# Patient Record
Sex: MALE | Race: WHITE | NOT HISPANIC OR LATINO | Employment: UNEMPLOYED | URBAN - METROPOLITAN AREA
[De-identification: names, ages, dates, MRNs, and addresses within clinical notes are randomized per-mention and may not be internally consistent; named-entity substitution may affect disease eponyms.]

---

## 2017-04-22 ENCOUNTER — APPOINTMENT (OUTPATIENT)
Dept: GENERAL RADIOLOGY | Facility: CLINIC | Age: 37
End: 2017-04-22
Attending: EMERGENCY MEDICINE
Payer: COMMERCIAL

## 2017-04-22 ENCOUNTER — HOSPITAL ENCOUNTER (EMERGENCY)
Facility: CLINIC | Age: 37
Discharge: HOME | End: 2017-04-22
Attending: EMERGENCY MEDICINE | Admitting: EMERGENCY MEDICINE
Payer: COMMERCIAL

## 2017-04-22 VITALS
TEMPERATURE: 98.5 F | HEIGHT: 75 IN | BODY MASS INDEX: 26.11 KG/M2 | HEART RATE: 71 BPM | OXYGEN SATURATION: 100 % | WEIGHT: 210 LBS | SYSTOLIC BLOOD PRESSURE: 151 MMHG | RESPIRATION RATE: 16 BRPM | DIASTOLIC BLOOD PRESSURE: 91 MMHG

## 2017-04-22 DIAGNOSIS — S93.402A SPRAIN OF LEFT ANKLE, UNSPECIFIED LIGAMENT, INITIAL ENCOUNTER: ICD-10-CM

## 2017-04-22 DIAGNOSIS — T73.3XXA OVEREXERTION FROM PROLONGED STATIC POSITION: ICD-10-CM
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
## 2017-04-22 DIAGNOSIS — S92.302A AVULSION FRACTURE OF METATARSAL BONE OF LEFT FOOT, CLOSED, INITIAL ENCOUNTER: ICD-10-CM

## 2017-04-22 DIAGNOSIS — S93.602A FOOT SPRAIN, LEFT, INITIAL ENCOUNTER: ICD-10-CM

## 2017-04-22 PROCEDURE — 99284 EMERGENCY DEPT VISIT MOD MDM: CPT

## 2017-04-22 PROCEDURE — 99284 EMERGENCY DEPT VISIT MOD MDM: CPT | Mod: Z6 | Performed by: EMERGENCY MEDICINE

## 2017-04-22 PROCEDURE — 73630 X-RAY EXAM OF FOOT: CPT | Mod: LT

## 2017-04-22 PROCEDURE — 73610 X-RAY EXAM OF ANKLE: CPT | Mod: LT

## 2017-04-22 RX ORDER — NAPROXEN 500 MG/1
500 TABLET ORAL 2 TIMES DAILY WITH MEALS
Qty: 16 TABLET | Refills: 0 | Status: SHIPPED | OUTPATIENT
Start: 2017-04-22 | End: 2017-04-30

## 2017-04-22 RX ORDER — OXYCODONE AND ACETAMINOPHEN 5; 325 MG/1; MG/1
1-2 TABLET ORAL EVERY 4 HOURS PRN
Qty: 15 TABLET | Refills: 0 | Status: SHIPPED | OUTPATIENT
Start: 2017-04-22

## 2017-04-22 ASSESSMENT — ENCOUNTER SYMPTOMS
NUMBNESS: 0
ARTHRALGIAS: 1
COLOR CHANGE: 0
WEAKNESS: 0
ABDOMINAL PAIN: 0
FEVER: 0
SHORTNESS OF BREATH: 0

## 2017-04-22 NOTE — ED PROVIDER NOTES
"  History     Chief Complaint   Patient presents with     Foot Pain     Rolled left foot playing BB yesterday.     HPI  Willis Mcnulty is a 36 year old male who resents with left ankle and left foot pain since last night. Patient was playing basketball and he states that he \"rolled the ankle\". Since then he has been experiencing constant, throbbing, moderate, non-radiating pain in left ankle and left foot that is worse with movement and better with rest.He did ice the affected area. He also took an ibuprofen this morning prior to arrival. No fevers. No chronic medical problems. He is a smoker.    I have reviewed the Medications, Allergies, Past Medical and Surgical History, and Social History in the Epic system.    Review of Systems   Constitutional: Negative for fever.   Respiratory: Negative for shortness of breath.    Cardiovascular: Negative for chest pain.   Gastrointestinal: Negative for abdominal pain.   Musculoskeletal: Positive for arthralgias ( left ankle) and gait problem.   Skin: Negative for color change.   Neurological: Negative for weakness and numbness.   All other systems reviewed and are negative.      Physical Exam   BP: (!) 153/104  Pulse: 72  Temp: 98.5  F (36.9  C)  Resp: 16  Height: 190.5 cm (6' 3\")  Weight: 95.3 kg (210 lb)  SpO2: 99 %  Physical Exam   Constitutional: He is oriented to person, place, and time. He appears well-developed and well-nourished. No distress.   HENT:   Head: Normocephalic and atraumatic.   Eyes: Conjunctivae and EOM are normal.   Neck: Normal range of motion.   Cardiovascular: Normal rate and intact distal pulses.    Pulses:       Dorsalis pedis pulses are 2+ on the right side, and 2+ on the left side.        Posterior tibial pulses are 2+ on the right side, and 2+ on the left side.   Pulmonary/Chest: Effort normal. No respiratory distress.   Musculoskeletal: Normal range of motion. He exhibits edema and tenderness.   Moderate edema to the lateral aspect of left " ankle and left, lateral foot. There is tenderness over left lateral malleolus.There is no tenderness over the base of the left, fifth metatarsal. No tenderness over the left midfoot. Left Achilles tendon is intact. Full range of motion, active and passive,in the left hip, knee, and ankle with mild pain in left ankle with range of motion. No tenderness over left knee.   Neurological: He is alert and oriented to person, place, and time. He has normal strength and normal reflexes. He displays normal reflexes. No cranial nerve deficit or sensory deficit. He exhibits normal muscle tone. Coordination and gait normal. GCS eye subscore is 4. GCS verbal subscore is 5. GCS motor subscore is 6.   Reflex Scores:       Patellar reflexes are 2+ on the right side and 2+ on the left side.       Achilles reflexes are 2+ on the right side and 2+ on the left side.  Skin: Skin is warm. He is not diaphoretic.   Psychiatric: He has a normal mood and affect. His behavior is normal. Judgment and thought content normal.   Nursing note and vitals reviewed.      ED Course     ED Course     Procedures             Critical Care time:  none               Labs Ordered and Resulted from Time of ED Arrival Up to the Time of Departure from the ED - No data to display    Assessments & Plan (with Medical Decision Making)   36-year-old man presenting with left ankle and left foot pain. Differential diagnosis: ankle sprain, contusion, fracture, unlikely dislocation.    After thorough history and physical examination patient appears to be in no acute distress. I will obtain an x-ray of the left ankle and left foot for further evaluation .Patient agrees with the plan.    I reviewed patient's x-rays and read the radiology report; there is no fracture in left ankle. There is a possible avulsion fracture in left foot. Patient was placed into a cam boot walker and referred to orthopedic clinic for further evaluation. I will give him prescription for naproxen  and Percocet. He'll be provided with crutches for gait support. He agrees with the plan.    I have reviewed the nursing notes.    I have reviewed the findings, diagnosis, plan and need for follow up with the patient.    Discharge Medication List as of 4/22/2017  8:37 AM      START taking these medications    Details   naproxen (NAPROSYN) 500 MG tablet Take 1 tablet (500 mg) by mouth 2 times daily (with meals) for 8 days, Disp-16 tablet, R-0, Local Print      oxyCODONE-acetaminophen (PERCOCET) 5-325 MG per tablet Take 1-2 tablets by mouth every 4 hours as needed for pain, Disp-15 tablet, R-0, Local Print             Final diagnoses:   Sprain of left ankle, unspecified ligament, initial encounter   Foot sprain, left, initial encounter   Avulsion fracture of metatarsal bone of left foot, closed, initial encounter       4/22/2017   [facility], [place], EMERGENCY DEPARTMENT     Catrina Nunez MD  04/22/17 6158

## 2017-04-22 NOTE — ED AVS SNAPSHOT
[facility], Emergency Department    [address]    [place] [address]    Phone:  [phone]    Fax:  [phone]                                       Willis Mcnulty   MRN: [id number]    Department:  [facility], Emergency Department   Date of Visit:  4/22/2017           Patient Information     Date Of Birth          1980        Your diagnoses for this visit were:     Sprain of left ankle, unspecified ligament, initial encounter     Foot sprain, left, initial encounter     Avulsion fracture of metatarsal bone of left foot, closed, initial encounter        You were seen by Catrina Nunez MD.        Discharge Instructions       Please make an appointment to follow up with Orthopedics (phone: [phone]) as soon as possible for further evaluation and recommendations.    Foot Fracture  You have a broken bone (fracture) in your foot. This will cause pain, swelling, and sometimes bruising. It will take about 4 to 6 weeks to heal. A foot fracture may be treated with a special shoe, splint, cast, or boot.  Home care  Follow these guidelines when caring for yourself at home:    You may be given a splint, cast, shoe, or boot to keep the injured area from moving. Unless you were told otherwise, use crutches or a walker. Don t put weight on the injured foot until your health care provider says you can do so. (You can rent crutches and a walker at many pharmacies and surgical or orthopedic supply stores.) Don t put weight on a splint, or it will break.    Keep your leg elevated to reduce pain and swelling. When sleeping, put a pillow under the injured leg. When sitting, support the injured leg so it is level with your waist. This is very important during the first 2 days (48 hours).    Put an ice pack on the injured area. Do this for 20 minutes every 1 to 2 hours the first day for pain relief. You can make an ice pack by wrapping a plastic bag of ice cubes in a thin towel. As the ice melts, be careful  that the splint/cast/boot/shoe doesn t get wet. You can place the ice pack directly over the splint or cast. Unless told otherwise, you can open the boot or shoe to apply the ice pack. Continue using the ice pack 3 to 4 times a day for the next 2 days. Then use the ice pack as needed to ease pain and swelling.    Keep the splint/cast/boot/shoe dry. When bathing, protect it with a large plastic bag, rubber-banded at the top end. If a fiberglass splint or cast or boot gets wet, you can dry it with a hair dryer. Unless told otherwise, you can take off the boot or shoe to bathe.    You may use acetaminophen or ibuprofen to control pain, unless another pain medicine was prescribed. If you have chronic liver or kidney disease, talk with your health care provider before using these medicines. Also talk with your provider if you ve had a stomach ulcer or GI bleeding.    Don t put creams or objects under the cast if you have itching.  Follow-up care  Follow up with your health care provider within 1 week, or as advised. This is to make sure the bone is healing the way it should. If you were given a splint, it may be changed to a cast or boot at your follow-up visit.  If X-rays were taken, a radiologist will look at them. You will be told of any new findings that may affect your care.  When to seek medical advice  Call your health care provider right away if any of these occur:    The cast cracks    The plaster cast or splint becomes wet or soft    The fiberglass cast or splint stays wet for more than 24 hours    Bad odor from the cast or wound fluid stains the cast    Tightness or pain under the cast or splint gets worse    Toes become swollen, cold, blue, numb, or tingly    You can t move your toes    Skin around cast becomes red    Fever of 101 F (38.3 C) or higher, or as directed by your health care provider    7007-2505 The [employer]. [address] [place] [address]. All rights reserved. This  information is not intended as a substitute for professional medical care. Always follow your healthcare professional's instructions.          Aircast   Traditional splints and casts for the foot and ankle protect the injury by preventing movement at the joints. However, many injuries heal better and faster if the injured joint can be moved, while protected at the same time. This is the reason for using an Aircast.  There are two common type of AirCasts:  1) Air-Stirrup  ankle splint  This is often used to treat ankle sprains. It contains padded air cells in a plastic frame that fits into your shoe. This allows you to walk while preventing the ankle joint from rolling in or out causing re-injury.  Ankle sprains can take 4-6 weeks to heal. Persons with severe injuries or over age 60 may require more time to heal. During that time, you are prone to re-injury by suddenly twisting your ankle again while the ligaments are still weak.  When treating a sprain, the Air-Stirrup splint should be worn whenever walking for at least four weeks, or as long as you continue to have ankle pain. You should continue to wear it at least 6 weeks whenever running, playing sports or any activity where there is increased risk of re-injury. Talk to your doctor for specific advice about the treatment of your condition.  2) SP-Walker  boot  This is a short boot that provides support and protection to the foot and ankle while allowing you to walk. It contains padded air cells that provide compression and help circulation. It is used for both foot and ankle injuries - both sprains and minor fractures. Talk to your doctor for specific advice about the treatment of your condition.  Air-Stirrup  and SP-Walker  are trademarks of [employer].  For more information about their products, see www.aircast.com.    2203-6442 The [employer]. [address] [place] [address], [place] [address]. All rights reserved. This information is not intended as a  substitute for professional medical care. Always follow your healthcare professional's instructions.          Foot Sprain    A sprain is a stretching or tearing of the ligaments that hold a joint together. There are no broken bones. Sprains generally take from 3-6 weeks to heal. A sprain may be treated with a splint, walking cast, or special boot. Mild sprains may not need any additional support.  Home care  The following guidelines will help you care for your injury at home:    Keep your leg elevated when sitting or lying down. This is very important during the first 48 hours to reduce swelling. Stay off the injured foot as much as possible until you can walk on it without pain. If needed, you may use crutches during the first week for this purpose. Crutches can be rented at many pharmacies or surgical/orthopedic supply stores.    You may be given a cast shoe to wear to prevent movement in your foot. If not, you can use a sandal or any shoe that does not put pressure on the injured area until the swelling and pain go away. If using a sandal, be careful not to hit your foot against anything, since another injury could make the sprain worse.    Apply an ice pack over the injured area for 15 to 20 minutes every 3 to 6 hours. You should do this for the first 24 to 48 hours. You can make an ice pack by filling a plastic bag that seals at the top with ice cubes and then wrapping it with a thin towel. Continue to use ice packs for relief of pain and swelling as needed. As the ice melts, avoid getting any wrap, splint, or cast wet. After 48 hours, apply heat from a warm shower or bath for 20 minutes several times daily. Alternating ice and heat may also be helpful.    You may use over-the-counter pain medicine to control pain, unless another medicine was prescribed. If you have chronic liver or kidney disease or ever had a stomach ulcer or GI bleeding, talk with your healthcare provider before using these medicines.    If  you were given a splint or cast, keep it dry. Bathe with your splint or cast well out of the water, protected with 2 large plastic bags, rubber-banded at the top end. If a fiberglass splint or cast gets wet, you can dry it with a hair dryer.    You may return to sports after healing, when you can run without pain.  Follow-up care  Follow up with your healthcare provider as directed. Sometimes fractures don t show up on the first X-ray. Bruises and sprains can sometimes hurt as much as a fracture. These injuries can take time to heal completely. If your symptoms don t improve or they get worse, talk with your healthcare provider. You may need a repeat X-ray.  When to seek medical advice  Call your healthcare provider right away if any of these occur:    The plaster cast or splint gets wet or soft    The fiberglass cast or splint gets wet and does not dry for 24 hours    Pain or swelling increases, or redness appears    A bad odor comes from within the cast    Fever of 100.4 F (38 C) or above lasting for 24 to 48 hours    Toes on the injured foot become cold, blue, numb, or tingly    3681-6048 The [employer]. [address] [place] [address]. All rights reserved. This information is not intended as a substitute for professional medical care. Always follow your healthcare professional's instructions.          Treating Ankle Sprains  Treatment will depend on how bad your sprain is. For a severe sprain, healing may take 3 months or more.  Right after your injury: Use R.I.C.E.    Rest: At first, keep weight off the ankle as much as you can. You may be given crutches to help you walk without putting weight on the ankle.    Ice: Put an ice pack on the ankle for 15 minutes. Remove the pack and wait at least 30 minutes. Repeat for up to 3 days. This helps reduce swelling.    Compression: To reduce swelling and keep the joint stable, you may need to wrap the ankle with an elastic bandage. For more severe  sprains, you may need an ankle brace or a cast.    Elevation: To reduce swelling, keep your ankle raised above your heart when you sit or lie down.  Medicine  Your healthcare provider may suggest oral non-steroidal anti-inflammatory medicine (NSAIDs), such as ibuprofen. This relieves the pain and helps reduce any swelling. Be sure to take your medicine as directed.  Contrast baths  After 3 days, soak your ankle in warm water for 30 seconds, then in cool water for 30 seconds. Go back and forth for 5 minutes. Doing this every 2 hours will help keep the swelling down.  Exercises    After about 2 to 3 weeks, you may be given exercises to strengthen the ligaments and muscles in the ankle. Doing these exercises will help prevent another ankle sprain. Exercises may include standing on your toes and then on your heels and doing ankle curls.  Ankle curls    Sit on the edge of a sturdy table or lie on your back.    Pull your toes toward you. Then point them away from you. Repeat for 2 to 3 minutes.     8891-2993 The [employer]. [address] [place] [address]. All rights reserved. This information is not intended as a substitute for professional medical care. Always follow your healthcare professional's instructions.            Discharge References/Attachments     CRUTCHES, USING: NON-WEIGHT-BEARING (ENGLISH)      24 Hour Appointment Hotline       To make an appointment at any [facility] clinic, call [phone] ([phone]). If you don't have a family doctor or clinic, we will help you find one. [facility] clinics are conveniently located to serve the needs of you and your family.          ED Discharge Orders     Ankilizer CAM boot           Crutches       Use gait belt during crutch training.            ORTHO  REFERRAL       [facility] is referring you to the Orthopedic  Services at [facility].       The  Representative will assist you  in the coordination of your Orthopedic and Musculoskeletal Care as prescribed by your physician.    The  Representative will call you within 1 business day to help schedule your appointment, or you may contact the  Representative at:    All areas ~ [phone]     Type of Referral : [facility] / Foot & Ankle Surgery       Timeframe requested: 3 - 5 days    Coverage of these services is subject to the terms and limitations of your health insurance plan.  Please call member services at your health plan with any benefit or coverage questions.      If X-rays, CT or MRI's have been performed, please contact the facility where they were done to arrange for , prior to your scheduled appointment.  Please bring this referral request to your appointment and present it to your specialist.                     Review of your medicines      START taking        Dose / Directions Last dose taken    naproxen 500 MG tablet   Commonly known as:  NAPROSYN   Dose:  500 mg   Quantity:  16 tablet        Take 1 tablet (500 mg) by mouth 2 times daily (with meals) for 8 days   Refills:  0        oxyCODONE-acetaminophen 5-325 MG per tablet   Commonly known as:  PERCOCET   Dose:  1-2 tablet   Quantity:  15 tablet        Take 1-2 tablets by mouth every 4 hours as needed for pain   Refills:  0          Our records show that you are taking the medicines listed below. If these are incorrect, please call your family doctor or clinic.        Dose / Directions Last dose taken    IBUPROFEN PO   Dose:  400 mg        Take 400 mg by mouth as needed for moderate pain   Refills:  0                Prescriptions were sent or printed at these locations (2 Prescriptions)                   Other Prescriptions                Printed at Department/Unit printer (2 of 2)         naproxen (NAPROSYN) 500 MG tablet               oxyCODONE-acetaminophen (PERCOCET) 5-325 MG per tablet                Procedures and tests performed  "during your visit     Ankle XR, G/E 3 views, left    Foot XR, G/E 3 views, left      Orders Needing Specimen Collection     None      Pending Results     No orders found from 2017 to 2017.            Pending Culture Results     No orders found from 2017 to 2017.            Thank you for choosing [facility]       Thank you for choosing [facility] for your care. Our goal is always to provide you with excellent care. Hearing back from our patients is one way we can continue to improve our services. Please take a few minutes to complete the written survey that you may receive in the mail after you visit with us. Thank you!        [employer] Information     [employer] lets you send messages to your doctor, view your test results, renew your prescriptions, schedule appointments and more. To sign up, go to [email or web address] . Click on \"Log in\" on the left side of the screen, which will take you to the Welcome page. Then click on \"Sign up Now\" on the right side of the page.     You will be asked to enter the access code listed below, as well as some personal information. Please follow the directions to create your username and password.     Your access code is: [email or web address]  Expires: 2017  8:37 AM     Your access code will  in 90 days. If you need help or a new code, please call your [facility] clinic or [phone].        Care EveryWhere ID     This is your Care EveryWhere ID. This could be used by other organizations to access your [facility] medical records  [id number]        After Visit Summary       This is your record. Keep this with you and show to your community pharmacist(s) and doctor(s) at your next visit.                  "

## 2017-04-22 NOTE — DISCHARGE INSTRUCTIONS
Please make an appointment to follow up with Orthopedics (phone: [phone]) as soon as possible for further evaluation and recommendations.    Foot Fracture  You have a broken bone (fracture) in your foot. This will cause pain, swelling, and sometimes bruising. It will take about 4 to 6 weeks to heal. A foot fracture may be treated with a special shoe, splint, cast, or boot.  Home care  Follow these guidelines when caring for yourself at home:    You may be given a splint, cast, shoe, or boot to keep the injured area from moving. Unless you were told otherwise, use crutches or a walker. Don t put weight on the injured foot until your health care provider says you can do so. (You can rent crutches and a walker at many pharmacies and surgical or orthopedic supply stores.) Don t put weight on a splint, or it will break.    Keep your leg elevated to reduce pain and swelling. When sleeping, put a pillow under the injured leg. When sitting, support the injured leg so it is level with your waist. This is very important during the first 2 days (48 hours).    Put an ice pack on the injured area. Do this for 20 minutes every 1 to 2 hours the first day for pain relief. You can make an ice pack by wrapping a plastic bag of ice cubes in a thin towel. As the ice melts, be careful that the splint/cast/boot/shoe doesn t get wet. You can place the ice pack directly over the splint or cast. Unless told otherwise, you can open the boot or shoe to apply the ice pack. Continue using the ice pack 3 to 4 times a day for the next 2 days. Then use the ice pack as needed to ease pain and swelling.    Keep the splint/cast/boot/shoe dry. When bathing, protect it with a large plastic bag, rubber-banded at the top end. If a fiberglass splint or cast or boot gets wet, you can dry it with a hair dryer. Unless told otherwise, you can take off the boot or shoe to bathe.    You may use acetaminophen or ibuprofen to control pain, unless another  pain medicine was prescribed. If you have chronic liver or kidney disease, talk with your health care provider before using these medicines. Also talk with your provider if you ve had a stomach ulcer or GI bleeding.    Don t put creams or objects under the cast if you have itching.  Follow-up care  Follow up with your health care provider within 1 week, or as advised. This is to make sure the bone is healing the way it should. If you were given a splint, it may be changed to a cast or boot at your follow-up visit.  If X-rays were taken, a radiologist will look at them. You will be told of any new findings that may affect your care.  When to seek medical advice  Call your health care provider right away if any of these occur:    The cast cracks    The plaster cast or splint becomes wet or soft    The fiberglass cast or splint stays wet for more than 24 hours    Bad odor from the cast or wound fluid stains the cast    Tightness or pain under the cast or splint gets worse    Toes become swollen, cold, blue, numb, or tingly    You can t move your toes    Skin around cast becomes red    Fever of 101 F (38.3 C) or higher, or as directed by your health care provider    0644-9864 The [employer]. [address] [place] [address]. All rights reserved. This information is not intended as a substitute for professional medical care. Always follow your healthcare professional's instructions.          Aircast   Traditional splints and casts for the foot and ankle protect the injury by preventing movement at the joints. However, many injuries heal better and faster if the injured joint can be moved, while protected at the same time. This is the reason for using an Aircast.  There are two common type of AirCasts:  1) Air-Stirrup  ankle splint  This is often used to treat ankle sprains. It contains padded air cells in a plastic frame that fits into your shoe. This allows you to walk while preventing the ankle  joint from rolling in or out causing re-injury.  Ankle sprains can take 4-6 weeks to heal. Persons with severe injuries or over age 60 may require more time to heal. During that time, you are prone to re-injury by suddenly twisting your ankle again while the ligaments are still weak.  When treating a sprain, the Air-Stirrup splint should be worn whenever walking for at least four weeks, or as long as you continue to have ankle pain. You should continue to wear it at least 6 weeks whenever running, playing sports or any activity where there is increased risk of re-injury. Talk to your doctor for specific advice about the treatment of your condition.  2) SP-Walker  boot  This is a short boot that provides support and protection to the foot and ankle while allowing you to walk. It contains padded air cells that provide compression and help circulation. It is used for both foot and ankle injuries - both sprains and minor fractures. Talk to your doctor for specific advice about the treatment of your condition.  Air-Stirrup  and SP-Walker  are trademarks of [employer].  For more information about their products, see www.aircast.com.    8137-5512 [employer]. [address] [place] [address]. All rights reserved. This information is not intended as a substitute for professional medical care. Always follow your healthcare professional's instructions.          Foot Sprain    A sprain is a stretching or tearing of the ligaments that hold a joint together. There are no broken bones. Sprains generally take from 3-6 weeks to heal. A sprain may be treated with a splint, walking cast, or special boot. Mild sprains may not need any additional support.  Home care  The following guidelines will help you care for your injury at home:    Keep your leg elevated when sitting or lying down. This is very important during the first 48 hours to reduce swelling. Stay off the injured foot as much as possible until you  can walk on it without pain. If needed, you may use crutches during the first week for this purpose. Crutches can be rented at many pharmacies or surgical/orthopedic supply stores.    You may be given a cast shoe to wear to prevent movement in your foot. If not, you can use a sandal or any shoe that does not put pressure on the injured area until the swelling and pain go away. If using a sandal, be careful not to hit your foot against anything, since another injury could make the sprain worse.    Apply an ice pack over the injured area for 15 to 20 minutes every 3 to 6 hours. You should do this for the first 24 to 48 hours. You can make an ice pack by filling a plastic bag that seals at the top with ice cubes and then wrapping it with a thin towel. Continue to use ice packs for relief of pain and swelling as needed. As the ice melts, avoid getting any wrap, splint, or cast wet. After 48 hours, apply heat from a warm shower or bath for 20 minutes several times daily. Alternating ice and heat may also be helpful.    You may use over-the-counter pain medicine to control pain, unless another medicine was prescribed. If you have chronic liver or kidney disease or ever had a stomach ulcer or GI bleeding, talk with your healthcare provider before using these medicines.    If you were given a splint or cast, keep it dry. Bathe with your splint or cast well out of the water, protected with 2 large plastic bags, rubber-banded at the top end. If a fiberglass splint or cast gets wet, you can dry it with a hair dryer.    You may return to sports after healing, when you can run without pain.  Follow-up care  Follow up with your healthcare provider as directed. Sometimes fractures don t show up on the first X-ray. Bruises and sprains can sometimes hurt as much as a fracture. These injuries can take time to heal completely. If your symptoms don t improve or they get worse, talk with your healthcare provider. You may need a repeat  X-ray.  When to seek medical advice  Call your healthcare provider right away if any of these occur:    The plaster cast or splint gets wet or soft    The fiberglass cast or splint gets wet and does not dry for 24 hours    Pain or swelling increases, or redness appears    A bad odor comes from within the cast    Fever of 100.4 F (38 C) or above lasting for 24 to 48 hours    Toes on the injured foot become cold, blue, numb, or tingly    3123-5680 [employer]. [address] [place] [address]. All rights reserved. This information is not intended as a substitute for professional medical care. Always follow your healthcare professional's instructions.          Treating Ankle Sprains  Treatment will depend on how bad your sprain is. For a severe sprain, healing may take 3 months or more.  Right after your injury: Use R.I.C.E.    Rest: At first, keep weight off the ankle as much as you can. You may be given crutches to help you walk without putting weight on the ankle.    Ice: Put an ice pack on the ankle for 15 minutes. Remove the pack and wait at least 30 minutes. Repeat for up to 3 days. This helps reduce swelling.    Compression: To reduce swelling and keep the joint stable, you may need to wrap the ankle with an elastic bandage. For more severe sprains, you may need an ankle brace or a cast.    Elevation: To reduce swelling, keep your ankle raised above your heart when you sit or lie down.  Medicine  Your healthcare provider may suggest oral non-steroidal anti-inflammatory medicine (NSAIDs), such as ibuprofen. This relieves the pain and helps reduce any swelling. Be sure to take your medicine as directed.  Contrast baths  After 3 days, soak your ankle in warm water for 30 seconds, then in cool water for 30 seconds. Go back and forth for 5 minutes. Doing this every 2 hours will help keep the swelling down.  Exercises    After about 2 to 3 weeks, you may be given exercises to strengthen the  ligaments and muscles in the ankle. Doing these exercises will help prevent another ankle sprain. Exercises may include standing on your toes and then on your heels and doing ankle curls.  Ankle curls    Sit on the edge of a sturdy table or lie on your back.    Pull your toes toward you. Then point them away from you. Repeat for 2 to 3 minutes.     2061-3563 [employer]. [address] [place] [address], [place] [address]. All rights reserved. This information is not intended as a substitute for professional medical care. Always follow your healthcare professional's instructions.

## 2017-04-22 NOTE — ED AVS SNAPSHOT
[facility], Emergency Department    [address]    [facility] [address]    Phone:  [phone]    Fax:  [phone]                                       Willis Mcnulty   MRN: [id number]    Department:  [facility], Emergency Department   Date of Visit:  4/22/2017           After Visit Summary Signature Page     I have received my discharge instructions, and my questions have been answered. I have discussed any challenges I see with this plan with the nurse or doctor.    ..........................................................................................................................................  Patient/Patient Representative Signature      ..........................................................................................................................................  Patient Representative Print Name and Relationship to Patient    ..................................................               ................................................  Date                                            Time    ..........................................................................................................................................  Reviewed by Signature/Title    ...................................................              ..............................................  Date                                                            Time

## 2017-04-28 ENCOUNTER — OFFICE VISIT (OUTPATIENT)
Dept: PODIATRY | Facility: CLINIC | Age: 37
End: 2017-04-28
Payer: COMMERCIAL

## 2017-04-28 VITALS — WEIGHT: 210 LBS | HEART RATE: 86 BPM | OXYGEN SATURATION: 99 % | BODY MASS INDEX: 26.25 KG/M2

## 2017-04-28 DIAGNOSIS — S93.402A SPRAIN OF LEFT ANKLE, UNSPECIFIED LIGAMENT, INITIAL ENCOUNTER: Primary | ICD-10-CM

## 2017-04-28 PROCEDURE — 99203 OFFICE O/P NEW LOW 30 MIN: CPT | Performed by: PODIATRIST

## 2017-04-28 NOTE — NURSING NOTE
"Chief Complaint   Patient presents with     Ankle Pain     left foot       Initial Pulse 86  Wt 95.3 kg (210 lb)  SpO2 99%  BMI 26.25 kg/m2 Estimated body mass index is 26.25 kg/(m^2) as calculated from the following:    Height as of 4/22/17: 1.905 m (6' 3\").    Weight as of this encounter: 95.3 kg (210 lb).  Medication Reconciliation: complete  "

## 2017-04-28 NOTE — PROGRESS NOTES
Subjective:    Patient seen as a new patient consult from Dr. Nunez and is seen today  for left ankle sprain.  Had inversion sprain 7 days ago.  Playing basketball in untied shoes!  Had pain and edema, but this is slowly getting better.  Seen in clinic, xrays taken, and given air cast.  Has walked on this  A few times and felt OK.  He is a .          ROS:  denies numbness, erythema, shortness of breath     No Known Allergies    Current Outpatient Prescriptions   Medication Sig Dispense Refill     IBUPROFEN PO Take 400 mg by mouth as needed for moderate pain       naproxen (NAPROSYN) 500 MG tablet Take 1 tablet (500 mg) by mouth 2 times daily (with meals) for 8 days 16 tablet 0     oxyCODONE-acetaminophen (PERCOCET) 5-325 MG per tablet Take 1-2 tablets by mouth every 4 hours as needed for pain 15 tablet 0       There is no problem list on file for this patient.      No past medical history on file.    Past Surgical History:   Procedure Laterality Date     ORTHOPEDIC SURGERY         No family history on file.    Social History   Substance Use Topics     Smoking status: Current Every Day Smoker     Packs/day: 0.25     Smokeless tobacco: Not on file     Alcohol use Yes      Comment: daily       Objective:    Pulse 86  Wt 95.3 kg (210 lb)  SpO2 99%  BMI 26.25 kg/m2.  Patient pleasant to talk with and in no apparent distress.   CRT < 3 seconds all toes.  No varicosities noted.   +2/4 DP & PT bilateral, sensation to light touch is intact.  Achilles reflex 2/4 bilateral.  Normal arch with weight bearing.  Muscle strength 5/5 in all compartments.  No pain with stressing any tendons.  Normal ROM all forefoot and rearfoot joints.  Cavus arch with weightbearing.     positive edema.  positive ecchymosis  negative erythema  positive pain at 3/4/5 TMTJs   No pain styloid process.  negative Achilles or calcaneal tubercle pain  negative medial ankle pain  negative pain AITF ligament  positive pain over  ATFL/CFL  negative pain with stressing or palpation peroneal tendons  negative peroneal subluxation  negative pain over medial or lateral malleoli    X-rays normal    Assessment:  Left  ankle sprain       Plan:  X-rays personally reviewed.  Discussed etiology and treatment options with the patient in detail.  Discussed PRICE.  Dispensed ankle brace to prevent reinjury while still healing.  If he feels unstable discussed physical therapy for lateral strengthening and proprioception training.  He declines at this time but will call if her feels he needs this.  Note for work to start in 3 days, weightbearing as tolerated.  Return to clinic prn.   Thank you for allowing me participate in the care of this patient.        Lawrence Pack DPM, FACFAS

## 2017-04-28 NOTE — LETTER
[address] [address]  Phone: [phone]    April 28, 2017        Willis Mcnulty  [address]  [facility] [address]          To whom it may concern:    RE: Willis Mcnulty    Patient was seen and treated today at our clinic and missed work.  Patient may return to work 5/1/17 with the following:  Activities as tolerated    Please contact me for questions or concerns.      Sincerely,        Lawrence Pack DPM

## 2017-04-28 NOTE — PATIENT INSTRUCTIONS
We wish you continued good healing. If you have any questions or concerns, please do not hesitate to contact us at [phone].      Please remember to call and schedule a follow up appointment if one was recommended at your earliest convenience.   PODIATRY CLINIC HOURS  TELEPHONE NUMBER    Dr. Lawrence Pack D.P.[facility]    Clinics:  [facility]        Ashlie Weiss MA  Medical Assistant  Tuesday 1PM-6PM  [place]  Wednesday 7AM-2PM  [place]  Thursday 10AM-6PM  [place] Friday 7AM-345PM  [place]  Specialty schedulers:   [phone] to make an appointment with any Specialty Provider.        Urgent Care locations:    [facility] Monday-Friday 5 pm - 9 pm. Saturday-Sunday 9 am -5pm    Monday-Friday 11 am - 9 pm Saturday 9 am - 5 pm     Monday-Sunday 12 noon-8PM [phone]     If you need a medication refill, please contact us you may need lab work and/or a follow up visit prior to your refill (i.e. Antifungal medications).    "[employer]hart (secure e-mail communication and access to your chart) to send a message or to make an appointment.    If MRI needed please call Tae Shafer at [phone]        Weight management plan: Patient was referred to their PCP to discuss a diet and exercise plan.  SMOKING CESSATION    What's in cigarette smoke? - Cigarette smoke contains over 4,000 chemicals. Nicotine is one of the main ingredients which is an insecticide/herbicide. It is poisonous to our nervous system, increases blood clotting risk, and decreases the body's defenses to fight off infection. Another chemical is Carbon Monoxide is an asphyxiating gas that permanently binds to blood cells and blocks the transport of oxygen. This leads to tissue death and decreases your metabolism. Tar is a chemical that coats your lungs and trachea which impairs new oxygen coming in and  carbon dioxide getting out of your body.   How does smoking impact surgery? - Smoking is particularly hazardous with regards to surgery. Surgery puts stress on the body and a smoker's body is already under strain from these chemicals. Putting the two together, especially for an elective surgery, could be a recipe for disaster. Smoking before and after surgery increases your risk of heart problems, slow wound healing, delayed bone healing, blood clots, wound infection and anesthesia complications.   What are the benefits of quitting? - In 20 minutes your blood pressure will drop back down to normal. In 8 hours the carbon monoxide (a toxic gas) levels in your blood stream will drop by half, and oxygen levels will return to normal. In 48 hours your chance of having a heart attack will have decreased. All nicotine will have left your body. Your sense of taste and smell will return to a normal level. In 72 hours your bronchial tubes will relax, and your energy levels will increase. In 2 weeks your circulation will increase, and it will continue to improve for the next 10 weeks.    Recommendations for elective surgery - Ideally, patients should quit smoking 8 weeks before and at least 2 weeks after elective surgery in order to avoid complications. Simply cutting back on the amount of cigarettes smoked per day does not offer any benefit or decrease the risk of poor wound healing, heart problems, and infection. Smokers should also start taking Vitamin C and B for two weeks before surgery and two weeks after surgery.    Ways to Stop Smokin. Nicotine patches, lozenges, or gum  2. Support Groups  3. Medications (see below)    List of Medications:  1. Varenicline Tartrate (CHANTIX)   2. Bupropion HCL (WELLBUTRIN, ZYBAN)   note: make sure Wellbutrin is for smoking cessation and not other issues   3. Nicotine Patch (NICODERM)   4. Nicotine Inhaler (NICOTROL)   5. Nicotine Gum Nicotine Polacrilex   6. Nicotine Lozenge:  Nicotine Polacrilex (COMMIT)   * [facility] offers a smoking support group as well!  Please visit: [email or web address]  If you are interested in these, ask about getting a prescription or talk to your primary care doctor about what may be the best way for you to quit.

## 2017-04-28 NOTE — LETTER
4/28/2017       RE: Willis Mcnulty  [address]  [facility] [address]           Dear Colleague,    Thank you for referring your patient, Willis Mcnulty, to the [facility]. Please see a copy of my visit note below.    Subjective:    Patient seen as a new patient consult from Dr. Nunez and is seen today  for left ankle sprain.  Had inversion sprain 7 days ago.  Playing basketball in untied shoes!  Had pain and edema, but this is slowly getting better.  Seen in clinic, xrays taken, and given air cast.  Has walked on this  A few times and felt OK.  He is a .          ROS:  denies numbness, erythema, shortness of breath     No Known Allergies    Current Outpatient Prescriptions   Medication Sig Dispense Refill     IBUPROFEN PO Take 400 mg by mouth as needed for moderate pain       naproxen (NAPROSYN) 500 MG tablet Take 1 tablet (500 mg) by mouth 2 times daily (with meals) for 8 days 16 tablet 0     oxyCODONE-acetaminophen (PERCOCET) 5-325 MG per tablet Take 1-2 tablets by mouth every 4 hours as needed for pain 15 tablet 0       There is no problem list on file for this patient.      No past medical history on file.    Past Surgical History:   Procedure Laterality Date     ORTHOPEDIC SURGERY         No family history on file.    Social History   Substance Use Topics     Smoking status: Current Every Day Smoker     Packs/day: 0.25     Smokeless tobacco: Not on file     Alcohol use Yes      Comment: daily       Objective:    Pulse 86  Wt 95.3 kg (210 lb)  SpO2 99%  BMI 26.25 kg/m2.  Patient pleasant to talk with and in no apparent distress.   CRT < 3 seconds all toes.  No varicosities noted.   +2/4 DP & PT bilateral, sensation to light touch is intact.  Achilles reflex 2/4 bilateral.  Normal arch with weight bearing.  Muscle strength 5/5 in all compartments.  No pain with stressing any tendons.  Normal ROM all forefoot and rearfoot joints.  Cavus arch with  weightbearing.     positive edema.  positive ecchymosis  negative erythema  positive pain at 3/4/5 TMTJs   No pain styloid process.  negative Achilles or calcaneal tubercle pain  negative medial ankle pain  negative pain AITF ligament  positive pain over ATFL/CFL  negative pain with stressing or palpation peroneal tendons  negative peroneal subluxation  negative pain over medial or lateral malleoli    X-rays normal    Assessment:  Left  ankle sprain       Plan:  X-rays personally reviewed.  Discussed etiology and treatment options with the patient in detail.  Discussed PRICE.  Dispensed ankle brace to prevent reinjury while still healing.  If he feels unstable discussed physical therapy for lateral strengthening and proprioception training.  He declines at this time but will call if her feels he needs this.  Note for work to start in 3 days, weightbearing as tolerated.  Return to clinic prn.   Thank you for allowing me participate in the care of this patient.        Lawrence Pack DPM, FACFAS        Again, thank you for allowing me to participate in the care of your patient.        Sincerely,              Lawrence Pack DPM

## 2017-04-28 NOTE — MR AVS SNAPSHOT
After Visit Summary   4/28/2017    Willis Mcnulty    MRN: [id number]           Patient Information     Date Of Birth          1980        Visit Information        Provider Department      4/28/2017 8:30 AM Lawrence Pack, DPMILAD [facility]        Care Instructions    We wish you continued good healing. If you have any questions or concerns, please do not hesitate to contact us at [phone].      Please remember to call and schedule a follow up appointment if one was recommended at your earliest convenience.   PODIATRY CLINIC HOURS  TELEPHONE NUMBER    Dr. Lawrence BUIPANNETTE [facility]    Clinics:  [facility]        Ashlie Weiss MA  Medical Assistant  Tuesday 1PM-6PM  [place] [facility]  Wednesday 7AM-2PM  [place]  Thursday 10AM-6PM  [place]  Friday 7AM-345PM  [place]  Specialty schedulers:   [phone] to make an appointment with any Specialty Provider.        Urgent Care locations:    [facility] Monday-Friday 5 pm - 9 pm. Saturday-Sunday 9 am -5pm    Monday-Friday 11 am - 9 pm Saturday 9 am - 5 pm     Monday-Sunday 12 noon-8PM [phone]     If you need a medication refill, please contact us you may need lab work and/or a follow up visit prior to your refill (i.e. Antifungal medications).    GoPollGohart (secure e-mail communication and access to your chart) to send a message or to make an appointment.    If MRI needed please call Tae Shafer at [phone]        Weight management plan: Patient was referred to their PCP to discuss a diet and exercise plan.  SMOKING CESSATION    What's in cigarette smoke? - Cigarette smoke contains over 4,000 chemicals. Nicotine is one of the main ingredients which is an insecticide/herbicide. It is poisonous to our nervous system, increases blood clotting risk, and decreases the body's  defenses to fight off infection. Another chemical is Carbon Monoxide is an asphyxiating gas that permanently binds to blood cells and blocks the transport of oxygen. This leads to tissue death and decreases your metabolism. Tar is a chemical that coats your lungs and trachea which impairs new oxygen coming in and carbon dioxide getting out of your body.   How does smoking impact surgery? - Smoking is particularly hazardous with regards to surgery. Surgery puts stress on the body and a smoker's body is already under strain from these chemicals. Putting the two together, especially for an elective surgery, could be a recipe for disaster. Smoking before and after surgery increases your risk of heart problems, slow wound healing, delayed bone healing, blood clots, wound infection and anesthesia complications.   What are the benefits of quitting? - In 20 minutes your blood pressure will drop back down to normal. In 8 hours the carbon monoxide (a toxic gas) levels in your blood stream will drop by half, and oxygen levels will return to normal. In 48 hours your chance of having a heart attack will have decreased. All nicotine will have left your body. Your sense of taste and smell will return to a normal level. In 72 hours your bronchial tubes will relax, and your energy levels will increase. In 2 weeks your circulation will increase, and it will continue to improve for the next 10 weeks.    Recommendations for elective surgery - Ideally, patients should quit smoking 8 weeks before and at least 2 weeks after elective surgery in order to avoid complications. Simply cutting back on the amount of cigarettes smoked per day does not offer any benefit or decrease the risk of poor wound healing, heart problems, and infection. Smokers should also start taking Vitamin C and B for two weeks before surgery and two weeks after surgery.    Ways to Stop Smokin. Nicotine patches, lozenges, or gum  2. Support Groups  3. Medications  "(see below)    List of Medications:  1. Varenicline Tartrate (CHANTIX)   2. Bupropion HCL (WELLBUTRIN, ZYBAN) - note: make sure Wellbutrin is for smoking cessation and not other issues   3. Nicotine Patch (NICODERM)   4. Nicotine Inhaler (NICOTROL)   5. Nicotine Gum Nicotine Polacrilex   6. Nicotine Lozenge: Nicotine Polacrilex (COMMIT)   * [facility] offers a smoking support group as well!  Please visit: [email or web address]  If you are interested in these, ask about getting a prescription or talk to your primary care doctor about what may be the best way for you to quit.               Follow-ups after your visit        Your next 10 appointments already scheduled     Apr 28, 2017  8:30 AM CDT   New Visit with Lawrence Pack DPM   [facility]    [address] [place] [phone]              Who to contact     If you have questions or need follow up information about today's clinic visit or your schedule please contact [facility] directly at [phone].  Normal or non-critical lab and imaging results will be communicated to you by MyChart, letter or phone within 4 business days after the clinic has received the results. If you do not hear from us within 7 days, please contact the clinic through MyChart or phone. If you have a critical or abnormal lab result, we will notify you by phone as soon as possible.  Submit refill requests through RuffaloCODY or call your pharmacy and they will forward the refill request to us. Please allow 3 business days for your refill to be completed.          Additional Information About Your Visit        [facility] Information     RuffaloCODY lets you send messages to your doctor, view your test results, renew your prescriptions, schedule appointments and more. To sign up, go to [email or web address] . Click on \"Log in\" on the left side of the screen, " "which will take you to the Welcome page. Then click on \"Sign up Now\" on the right side of the page.     You will be asked to enter the access code listed below, as well as some personal information. Please follow the directions to create your username and password.     Your access code is: [email or web address]  Expires: 2017  8:37 AM     Your access code will  in 90 days. If you need help or a new code, please call your [facility] clinic or [phone].        Care EveryWhere ID     This is your Care EveryWhere ID. This could be used by other organizations to access your [facility] medical records  [id number]        Your Vitals Were     Pulse Pulse Oximetry BMI (Body Mass Index)             86 99% 26.25 kg/m2          Blood Pressure from Last 3 Encounters:   17 (!) 151/91   10/27/13 123/80    Weight from Last 3 Encounters:   17 95.3 kg (210 lb)   17 95.3 kg (210 lb)   10/27/13 86.2 kg (190 lb)              Today, you had the following     No orders found for display       Primary Care Provider Office Phone # Fax #    Tex Ryan [phone]       [address] [address]        Thank you!     Thank you for choosing [facility]  for your care. Our goal is always to provide you with excellent care. Hearing back from our patients is one way we can continue to improve our services. Please take a few minutes to complete the written survey that you may receive in the mail after your visit with us. Thank you!             Your Updated Medication List - Protect others around you: Learn how to safely use, store and throw away your medicines at www.disposemymeds.org.          This list is accurate as of: 17  8:23 AM.  Always use your most recent med list.                   Brand Name Dispense Instructions for use    IBUPROFEN PO      Take 400 mg by mouth as needed for moderate pain       naproxen 500 MG tablet    NAPROSYN "    16 tablet    Take 1 tablet (500 mg) by mouth 2 times daily (with meals) for 8 days       oxyCODONE-acetaminophen 5-325 MG per tablet    PERCOCET    15 tablet    Take 1-2 tablets by mouth every 4 hours as needed for pain

## 2017-05-05 ENCOUNTER — TELEPHONE (OUTPATIENT)
Dept: PODIATRY | Facility: CLINIC | Age: 37
End: 2017-05-05

## 2017-05-05 NOTE — TELEPHONE ENCOUNTER
Form completed for: Willis Mcnulty.   Short Term Disability forms. Forms sent to scanning    What was done with form: Fax form to:  [phone]     Ashlie Weiss MA

## 2021-02-17 ENCOUNTER — TRANSFERRED RECORDS (OUTPATIENT)
Dept: HEALTH INFORMATION MANAGEMENT | Facility: CLINIC | Age: 41
End: 2021-02-17

## 2021-06-10 ENCOUNTER — APPOINTMENT (OUTPATIENT)
Dept: GENERAL RADIOLOGY | Facility: CLINIC | Age: 41
End: 2021-06-10
Attending: EMERGENCY MEDICINE
Payer: COMMERCIAL

## 2021-06-10 ENCOUNTER — HOSPITAL ENCOUNTER (EMERGENCY)
Facility: CLINIC | Age: 41
Discharge: HOME | End: 2021-06-10
Attending: EMERGENCY MEDICINE | Admitting: EMERGENCY MEDICINE
Payer: COMMERCIAL

## 2021-06-10 VITALS
RESPIRATION RATE: 16 BRPM | SYSTOLIC BLOOD PRESSURE: 158 MMHG | TEMPERATURE: 98.1 F | OXYGEN SATURATION: 99 % | DIASTOLIC BLOOD PRESSURE: 104 MMHG | HEART RATE: 54 BPM

## 2021-06-10 DIAGNOSIS — R07.9 CHEST PAIN, UNSPECIFIED TYPE: ICD-10-CM

## 2021-06-10 LAB
ALBUMIN SERPL-MCNC: 4.6 G/DL (ref 3.4–5)
ALP SERPL-CCNC: 36 U/L (ref 40–150)
ALT SERPL W P-5'-P-CCNC: 43 U/L (ref 0–70)
ANION GAP SERPL CALCULATED.3IONS-SCNC: 9 MMOL/L (ref 3–14)
AST SERPL W P-5'-P-CCNC: 25 U/L (ref 0–45)
BASOPHILS # BLD AUTO: 0.1 10E9/L (ref 0–0.2)
BASOPHILS NFR BLD AUTO: 1.4 %
BILIRUB SERPL-MCNC: 1.1 MG/DL (ref 0.2–1.3)
BUN SERPL-MCNC: 11 MG/DL (ref 7–30)
CALCIUM SERPL-MCNC: 9.7 MG/DL (ref 8.5–10.1)
CHLORIDE SERPL-SCNC: 105 MMOL/L (ref 94–109)
CO2 SERPL-SCNC: 25 MMOL/L (ref 20–32)
CREAT SERPL-MCNC: 0.93 MG/DL (ref 0.66–1.25)
D DIMER PPP FEU-MCNC: <0.3 UG/ML FEU (ref 0–0.5)
DIFFERENTIAL METHOD BLD: ABNORMAL
EOSINOPHIL # BLD AUTO: 0.3 10E9/L (ref 0–0.7)
EOSINOPHIL NFR BLD AUTO: 5.4 %
ERYTHROCYTE [DISTWIDTH] IN BLOOD BY AUTOMATED COUNT: 11.9 % (ref 10–15)
GFR SERPL CREATININE-BSD FRML MDRD: >90 ML/MIN/{1.73_M2}
GLUCOSE SERPL-MCNC: 76 MG/DL (ref 70–99)
HCT VFR BLD AUTO: 37.7 % (ref 40–53)
HGB BLD-MCNC: 12.9 G/DL (ref 13.3–17.7)
IMM GRANULOCYTES # BLD: 0 10E9/L (ref 0–0.4)
IMM GRANULOCYTES NFR BLD: 0.5 %
LABORATORY COMMENT REPORT: NORMAL
LIPASE SERPL-CCNC: 158 U/L (ref 73–393)
LYMPHOCYTES # BLD AUTO: 1.5 10E9/L (ref 0.8–5.3)
LYMPHOCYTES NFR BLD AUTO: 25.7 %
MCH RBC QN AUTO: 33.9 PG (ref 26.5–33)
MCHC RBC AUTO-ENTMCNC: 34.2 G/DL (ref 31.5–36.5)
MCV RBC AUTO: 99 FL (ref 78–100)
MONOCYTES # BLD AUTO: 0.6 10E9/L (ref 0–1.3)
MONOCYTES NFR BLD AUTO: 10.8 %
NEUTROPHILS # BLD AUTO: 3.2 10E9/L (ref 1.6–8.3)
NEUTROPHILS NFR BLD AUTO: 56.2 %
NRBC # BLD AUTO: 0 10*3/UL
NRBC BLD AUTO-RTO: 0 /100
NT-PROBNP SERPL-MCNC: 59 PG/ML (ref 0–450)
PLATELET # BLD AUTO: 227 10E9/L (ref 150–450)
POTASSIUM SERPL-SCNC: 4.2 MMOL/L (ref 3.4–5.3)
PROT SERPL-MCNC: 7.7 G/DL (ref 6.8–8.8)
RBC # BLD AUTO: 3.8 10E12/L (ref 4.4–5.9)
SARS-COV-2 RNA RESP QL NAA+PROBE: NEGATIVE
SODIUM SERPL-SCNC: 139 MMOL/L (ref 133–144)
SPECIMEN SOURCE: NORMAL
TROPONIN I SERPL-MCNC: <0.015 UG/L (ref 0–0.04)
WBC # BLD AUTO: 5.8 10E9/L (ref 4–11)

## 2021-06-10 PROCEDURE — 99285 EMERGENCY DEPT VISIT HI MDM: CPT | Mod: 25 | Performed by: EMERGENCY MEDICINE

## 2021-06-10 PROCEDURE — 80053 COMPREHEN METABOLIC PANEL: CPT | Performed by: EMERGENCY MEDICINE

## 2021-06-10 PROCEDURE — 84484 ASSAY OF TROPONIN QUANT: CPT | Performed by: EMERGENCY MEDICINE

## 2021-06-10 PROCEDURE — 71045 X-RAY EXAM CHEST 1 VIEW: CPT

## 2021-06-10 PROCEDURE — C9803 HOPD COVID-19 SPEC COLLECT: HCPCS | Performed by: EMERGENCY MEDICINE

## 2021-06-10 PROCEDURE — 93005 ELECTROCARDIOGRAM TRACING: CPT | Performed by: EMERGENCY MEDICINE

## 2021-06-10 PROCEDURE — 85025 COMPLETE CBC W/AUTO DIFF WBC: CPT | Performed by: EMERGENCY MEDICINE

## 2021-06-10 PROCEDURE — 85379 FIBRIN DEGRADATION QUANT: CPT | Performed by: EMERGENCY MEDICINE

## 2021-06-10 PROCEDURE — 93010 ELECTROCARDIOGRAM REPORT: CPT | Performed by: EMERGENCY MEDICINE

## 2021-06-10 PROCEDURE — 87635 SARS-COV-2 COVID-19 AMP PRB: CPT | Performed by: EMERGENCY MEDICINE

## 2021-06-10 PROCEDURE — 83690 ASSAY OF LIPASE: CPT | Performed by: EMERGENCY MEDICINE

## 2021-06-10 PROCEDURE — 83880 ASSAY OF NATRIURETIC PEPTIDE: CPT | Performed by: EMERGENCY MEDICINE

## 2021-06-10 RX ORDER — LOSARTAN POTASSIUM 100 MG/1
100 TABLET ORAL DAILY
COMMUNITY
Start: 2021-01-07

## 2021-06-10 RX ORDER — CARVEDILOL 6.25 MG/1
12.5 TABLET ORAL 2 TIMES DAILY
COMMUNITY
Start: 2021-06-08

## 2021-06-10 RX ORDER — LORAZEPAM 0.5 MG/1
TABLET ORAL
COMMUNITY
Start: 2021-06-02 | End: 2023-01-29

## 2021-06-10 ASSESSMENT — ENCOUNTER SYMPTOMS
HEADACHES: 0
EYE REDNESS: 0
BACK PAIN: 0
SHORTNESS OF BREATH: 1
NAUSEA: 0
ABDOMINAL PAIN: 0
SORE THROAT: 0
NECK PAIN: 0
DIFFICULTY URINATING: 0
VOMITING: 0
WEAKNESS: 0
CHEST TIGHTNESS: 1
SLEEP DISTURBANCE: 0
DYSPHORIC MOOD: 0
FEVER: 0
COUGH: 0

## 2021-06-10 NOTE — ED TRIAGE NOTES
Pt states having on and off distal sternum/mid epig and back pain since Friday.  Pt states it seem unrelated to activity but today the pain was relieved with a baby Asprin. Pt states he talked to his PMD and was told to come in and get it checked.

## 2021-06-10 NOTE — DISCHARGE INSTRUCTIONS
Contact your primary care provider today to arrange an outpatient stress test.  Take a coated baby aspirin (81 mg) by mouth every day.    Return to the emergency department if recurrent or worsening symptoms.    Follow-up with your primary care provider.

## 2021-06-10 NOTE — ED NOTES
Discussed blood pressure with Dr. Guerrero who states we should encourage the patient to take his blood pressure medications at home as prescribed.

## 2021-06-10 NOTE — ED PROVIDER NOTES
ED Provider Note  [facility]      History     Chief Complaint   Patient presents with     Back Pain     Chest Pain     HPI  Willis Mcnulty is a 40 year old male who presents to the emergency department for evaluation of chest pain.  Patient states that he has been having episodes of chest pain for the past 6 days.  Patient states that the pain has been a sharp, stabbing pain as well as a tightness.  It has occurred centrally as well as laterally in his chest bilaterally.  He has also had some discomfort in his back as well.  He denies radiation of the pain.  He did have some shortness of breath on the first day that it occurred but not since.  He has had a nonproductive cough for the past year that is unchanged.  He denies any pleuritic component to the pain.  He denies any leg pain or swelling.  He denies any diaphoresis but does note that has been excessively hot out for the past week and has been sweating as a result.  He denies any nausea or vomiting.  No abdominal pain.  No recent travel or prolonged immobilization.    Past Medical History  No past medical history on file.  Past Surgical History:   Procedure Laterality Date     ORTHOPEDIC SURGERY       carvedilol (COREG) 6.25 MG tablet  losartan (COZAAR) 100 MG tablet  IBUPROFEN PO  LORazepam (ATIVAN) 0.5 MG tablet  oxyCODONE-acetaminophen (PERCOCET) 5-325 MG per tablet      No Known Allergies  Family History  No family history on file.  Social History   Social History     Tobacco Use     Smoking status: Current Every Day Smoker     Packs/day: 0.25   Substance Use Topics     Alcohol use: Yes     Comment: daily     Drug use: No      Past medical history, past surgical history, medications, allergies, family history, and social history were reviewed with the patient. No additional pertinent items.       Review of Systems   Constitutional: Negative for fever.   HENT: Negative for congestion and sore throat.    Eyes: Negative for  redness.   Respiratory: Positive for chest tightness and shortness of breath. Negative for cough.    Cardiovascular: Positive for chest pain.   Gastrointestinal: Negative for abdominal pain, nausea and vomiting.   Genitourinary: Negative for difficulty urinating.   Musculoskeletal: Negative for back pain and neck pain.   Skin: Negative for rash.   Neurological: Negative for weakness and headaches.   Psychiatric/Behavioral: Negative for dysphoric mood, sleep disturbance and suicidal ideas.   All other systems reviewed and are negative.        Physical Exam   BP: (!) 164/94  Pulse: 71  Temp: 98.5  F (36.9  C)  Resp: 18  SpO2: 100 %  Physical Exam  Vitals signs and nursing note reviewed.   Constitutional:       General: He is not in acute distress.     Appearance: He is well-developed. He is not diaphoretic.   HENT:      Head: Atraumatic.   Eyes:      General: No scleral icterus.     Pupils: Pupils are equal, round, and reactive to light.   Cardiovascular:      Rate and Rhythm: Normal rate and regular rhythm.      Heart sounds: Normal heart sounds.   Pulmonary:      Effort: No respiratory distress.      Breath sounds: Normal breath sounds.   Abdominal:      General: Bowel sounds are normal.      Palpations: Abdomen is soft.      Tenderness: There is no abdominal tenderness.   Musculoskeletal: Normal range of motion.         General: No tenderness.      Right lower leg: He exhibits no tenderness. No edema.      Left lower leg: He exhibits no tenderness. No edema.   Skin:     General: Skin is warm and dry.      Findings: No rash.   Neurological:      Mental Status: He is alert.         ED Course      Procedures             EKG Interpretation:      Interpreted by VITALY SIMS MD, MD  Time reviewed: 1118  Symptoms at time of EKG: none   Rhythm: sinus bradycardia  Rate: 50  Axis: normal  Ectopy: none  Conduction: normal  ST Segments/ T Waves: No ST-T wave changes  Q Waves: none  Comparison to prior: No old EKG  available    Clinical Impression: normal EKG    Results for orders placed or performed during the hospital encounter of 06/10/21   XR Chest Port 1 View     Status: None    Narrative    XR CHEST PORT 1 VIEW 6/10/2021 1:00 PM    HISTORY: chest pain    COMPARISON: 5/8/2008      Impression    IMPRESSION: No focal infiltrate, pleural effusion or pneumothorax. The  cardiac and mediastinal silhouettes are normal. Old healed right mid  clavicular fracture deformity.    SAMINA HILTON MD   CBC with platelets differential     Status: Abnormal   Result Value Ref Range    WBC 5.8 4.0 - 11.0 10e9/L    RBC Count 3.80 (L) 4.4 - 5.9 10e12/L    Hemoglobin 12.9 (L) 13.3 - 17.7 g/dL    Hematocrit 37.7 (L) 40.0 - 53.0 %    MCV 99 78 - 100 fl    MCH 33.9 (H) 26.5 - 33.0 pg    MCHC 34.2 31.5 - 36.5 g/dL    RDW 11.9 10.0 - 15.0 %    Platelet Count 227 150 - 450 10e9/L    Diff Method Automated Method     % Neutrophils 56.2 %    % Lymphocytes 25.7 %    % Monocytes 10.8 %    % Eosinophils 5.4 %    % Basophils 1.4 %    % Immature Granulocytes 0.5 %    Nucleated RBCs 0 0 /100    Absolute Neutrophil 3.2 1.6 - 8.3 10e9/L    Absolute Lymphocytes 1.5 0.8 - 5.3 10e9/L    Absolute Monocytes 0.6 0.0 - 1.3 10e9/L    Absolute Eosinophils 0.3 0.0 - 0.7 10e9/L    Absolute Basophils 0.1 0.0 - 0.2 10e9/L    Abs Immature Granulocytes 0.0 0 - 0.4 10e9/L    Absolute Nucleated RBC 0.0    Comprehensive metabolic panel     Status: Abnormal   Result Value Ref Range    Sodium 139 133 - 144 mmol/L    Potassium 4.2 3.4 - 5.3 mmol/L    Chloride 105 94 - 109 mmol/L    Carbon Dioxide 25 20 - 32 mmol/L    Anion Gap 9 3 - 14 mmol/L    Glucose 76 70 - 99 mg/dL    Urea Nitrogen 11 7 - 30 mg/dL    Creatinine 0.93 0.66 - 1.25 mg/dL    GFR Estimate >90 >60 mL/min/[1.73_m2]    GFR Estimate If Black >90 >60 mL/min/[1.73_m2]    Calcium 9.7 8.5 - 10.1 mg/dL    Bilirubin Total 1.1 0.2 - 1.3 mg/dL    Albumin 4.6 3.4 - 5.0 g/dL    Protein Total 7.7 6.8 - 8.8 g/dL    Alkaline  Phosphatase 36 (L) 40 - 150 U/L    ALT 43 0 - 70 U/L    AST 25 0 - 45 U/L   Troponin I     Status: None   Result Value Ref Range    Troponin I ES <0.015 0.000 - 0.045 ug/L   Nt probnp inpatient (BNP)     Status: None   Result Value Ref Range    N-Terminal Pro BNP Inpatient 59 0 - 450 pg/mL   D dimer quantitative     Status: None   Result Value Ref Range    D Dimer <0.3 0.0 - 0.50 ug/ml FEU   Asymptomatic SARS-CoV-2 COVID-19 Virus (Coronavirus) by PCR     Status: None    Specimen: Nasopharyngeal   Result Value Ref Range    SARS-CoV-2 Virus Specimen Source Nasopharyngeal     SARS-CoV-2 PCR Result NEGATIVE     SARS-CoV-2 PCR Comment (Note)    Lipase     Status: None   Result Value Ref Range    Lipase 158 73 - 393 U/L   EKG 12 lead     Status: None (Preliminary result)   Result Value Ref Range    Interpretation ECG Click View Image link to view waveform and result       Offered observation admission for stress echo which she declined.  Will pursue on outpatient basis.     Medications - No data to display     Assessments & Plan (with Medical Decision Making)   40 year old L to the emergency department with episodic chest pain for the past 6 days.  Differential diagnosis includes ACS, angina, pulmonary embolism, pneumothorax, pneumonia, COVID-19, GERD.  Will check labs, EKG, and chest radiograph.      The patient's EKG does not have any acute ischemic change and his troponin is normal so do not suspect ACS.  His D-dimer is negative so do not suspect pulmonary embolism.  He has a normal chest radiograph do not suspect pneumothorax or pneumonia.  He does not have any clinical signs or symptoms concerning for aortic dissection.  Patient is already on a PPI for GERD.  His symptoms are concerning enough that I recommended pursuing stress echo.  He declined observation admission and will perform stress echo on an outpatient basis as arranged by his primary care provider.  He was asked to take a daily baby aspirin until  evaluation complete.  Patient was also asked to return to the emergency department if worsening symptoms or other concerns.    I have reviewed the nursing notes. I have reviewed the findings, diagnosis, plan and need for follow up with the patient.    Discharge Medication List as of 6/10/2021  2:12 PM          Final diagnoses:   Chest pain, unspecified type     Chart documentation was completed with Dragon voice-recognition software. Even though reviewed, this chart may still contain some grammatical, spelling, and word errors.     --  Juaquin Guerrero Md  [facility] EMERGENCY DEPARTMENT  6/10/2021     Juaquin Guerrero MD  06/10/21 7712

## 2021-06-11 LAB — INTERPRETATION ECG - MUSE: NORMAL

## 2021-09-24 ENCOUNTER — TRANSCRIBE ORDERS (OUTPATIENT)
Dept: OTHER | Age: 41
End: 2021-09-24

## 2021-09-24 ENCOUNTER — TRANSFERRED RECORDS (OUTPATIENT)
Dept: HEALTH INFORMATION MANAGEMENT | Facility: CLINIC | Age: 41
End: 2021-09-24

## 2021-09-24 DIAGNOSIS — J34.89 SINUS DRAINAGE: ICD-10-CM

## 2021-09-24 DIAGNOSIS — R09.89 THROAT CLEARING: Primary | ICD-10-CM

## 2021-09-24 NOTE — TELEPHONE ENCOUNTER
FUTURE VISIT INFORMATION      FUTURE VISIT INFORMATION:    Date: 12/3/21    Time: 3 PM    Location: [facility]  REFERRAL INFORMATION:    Referring provider: Self-Referred    Referring providers clinic:      Reason for visit/diagnosis: Throat Clearing    RECORDS REQUESTED FROM:       Clinic name Comments Records Status Imaging Status   [facility] 6/10/21 - ED OV with Dr. Guerrero [facility]    [facility] - Imaging 6/10/21 - XR Chest [facility] PACs   [facility] 7/2/21 - PCC VV with Dr. Ramos  7/10/19 - PCC OV with Ervin Singer NP Care Everywhere    [facility] 7/23/20 - ENT OV with Dr. Gallo   Care Everywhere    [facility] 7/15/13 - ENT OV with Dr. Santamaria Care Everywhere    [facility] - Imaging 9/12/19 - MRI Brain Care Everywhere 9/24 Req - In PACs   [facility] 9/24/21 - OV with Dr. Taylor 9/27 Sent to Scan    [facility] - Procedure 2/17/21 - EGD 9/29 Sent to Scan      * 9/24/21 9:55 AM Faxed req to [place] and [facility] for images/recs - [facility]  * 9/27/21 9:41 AM Records received from [facility] and sent to [facility] to be scanned into the chart. - [facility]  * 10/5/21 9:47 AM Faxed req to [facility] for images to be pushed to [place] PACs. - [place]

## 2021-11-30 ENCOUNTER — TELEPHONE (OUTPATIENT)
Dept: OTOLARYNGOLOGY | Facility: CLINIC | Age: 41
End: 2021-11-30
Payer: COMMERCIAL

## 2021-11-30 NOTE — TELEPHONE ENCOUNTER
Writer contacted patient to change appointment with Dr. Patel 12/3/21 3:00 PM from a virtual to an in person appointment. Patient confirmed change.    Isael Vincent, EMT

## 2021-12-02 ENCOUNTER — TELEPHONE (OUTPATIENT)
Dept: OTOLARYNGOLOGY | Facility: CLINIC | Age: 41
End: 2021-12-02
Payer: COMMERCIAL

## 2021-12-02 NOTE — TELEPHONE ENCOUNTER
Writer contacted patient to confirm appointment with Dr. Patel 12/3/21 3:00 PM. Patient confirmed.    Isael Vincent, EMT

## 2021-12-03 ENCOUNTER — OFFICE VISIT (OUTPATIENT)
Dept: OTOLARYNGOLOGY | Facility: CLINIC | Age: 41
End: 2021-12-03
Attending: INTERNAL MEDICINE
Payer: COMMERCIAL

## 2021-12-03 ENCOUNTER — VIRTUAL VISIT (OUTPATIENT)
Dept: OTOLARYNGOLOGY | Facility: CLINIC | Age: 41
End: 2021-12-03
Payer: COMMERCIAL

## 2021-12-03 ENCOUNTER — PRE VISIT (OUTPATIENT)
Dept: OTOLARYNGOLOGY | Facility: CLINIC | Age: 41
End: 2021-12-03

## 2021-12-03 VITALS — WEIGHT: 219 LBS | OXYGEN SATURATION: 98 % | HEIGHT: 75 IN | BODY MASS INDEX: 27.23 KG/M2 | HEART RATE: 61 BPM

## 2021-12-03 DIAGNOSIS — R09.89 THROAT CLEARING: Primary | ICD-10-CM

## 2021-12-03 DIAGNOSIS — J34.89 SINUS DRAINAGE: ICD-10-CM

## 2021-12-03 DIAGNOSIS — R49.0 DYSPHONIA: ICD-10-CM

## 2021-12-03 PROCEDURE — 92524 BEHAVRAL QUALIT ANALYS VOICE: CPT | Mod: GN | Performed by: SPEECH-LANGUAGE PATHOLOGIST

## 2021-12-03 PROCEDURE — 99203 OFFICE O/P NEW LOW 30 MIN: CPT | Mod: 25 | Performed by: OTOLARYNGOLOGY

## 2021-12-03 PROCEDURE — 31575 DIAGNOSTIC LARYNGOSCOPY: CPT | Performed by: OTOLARYNGOLOGY

## 2021-12-03 RX ORDER — FLUTICASONE PROPIONATE 50 MCG
2 SPRAY, SUSPENSION (ML) NASAL 2 TIMES DAILY
Qty: 11.1 ML | Refills: 1 | Status: SHIPPED | OUTPATIENT
Start: 2021-12-03 | End: 2022-01-02

## 2021-12-03 ASSESSMENT — MIFFLIN-ST. JEOR: SCORE: 1984.01

## 2021-12-03 ASSESSMENT — PAIN SCALES - GENERAL: PAINLEVEL: NO PAIN (0)

## 2021-12-03 NOTE — LETTER
12/3/2021       RE: Willis Mcnulty  [address]  [facility] [address]     Dear Colleague,    Thank you for referring your patient, Willis Mcnulty, to the [facility] [place] at [facility]. Please see a copy of my visit note below.        [facility]   at the [facility]   Otolaryngology Clinic     Patient: Willis Mcnulty    MRN: [id number]    : 1980    Age/Gender: 41 year old male  Date of Service: 12/3/2021  Rendering Provider:   Vita Patel MD     Referring Provider   PCP: Virgie Ramos  Referring Physician: Noel Haines MD  Reason for Consultation   Chronic throat clearing  History   HISTORY OF PRESENT ILLNESS: I was asked to consult on Willis Mcnulty, by Dr Noel Haines for evaluation of throat clearing . Mr. Mcnulty is a 41 year old male who presents to us today with throat clearing.        he presents today for evaluation. he reports:    Dysphonia  - denies    Dysphagia  - denies   - able to eat a sandwich    Dyspnea  - denies  - stopped smoking over 1 year ago    Throat clearing/cough  - for the past 1.5 years  - woke up in April with sensation of something caught in the back of his nose  - feels like his left nostril is plugged  - he then has to get it out and dry heaves and can't always get it out   - he has to gag a lot  - he sometimes vomits  - and then the irritation stays all day\  - this is very bothersome  - he throat clears  - this is not worse with talking  - it can be worse with physical activity  - always worse in the morning  - feels a globus sensation that he just cant swallow      GERD/LPRD   - had EGD in February with finding of esophagitis  - got started on PPI  - no longer has reflux  - denies AM sore throat       PAST MEDICAL HISTORY: No past medical history on file.    PAST SURGICAL HISTORY:   Past Surgical History:   Procedure Laterality Date     ORTHOPEDIC SURGERY          CURRENT MEDICATIONS:   Current Outpatient Medications:      carvedilol (COREG) 6.25 MG tablet, Take 12.5 mg by mouth 2 times daily, Disp: , Rfl:      IBUPROFEN PO, Take 400 mg by mouth as needed for moderate pain, Disp: , Rfl:      LORazepam (ATIVAN) 0.5 MG tablet, TAKE 0.5MG TO 1 MG BY MOUTH ONCE DAILY, Disp: , Rfl:      losartan (COZAAR) 100 MG tablet, Take 100 mg by mouth daily, Disp: , Rfl:      oxyCODONE-acetaminophen (PERCOCET) 5-325 MG per tablet, Take 1-2 tablets by mouth every 4 hours as needed for pain, Disp: 15 tablet, Rfl: 0    ALLERGIES: Patient has no known allergies.    SOCIAL HISTORY:    Social History     Socioeconomic History     Marital status: Single     Spouse name: Not on file     Number of children: Not on file     Years of education: Not on file     Highest education level: Not on file   Occupational History     Not on file   Tobacco Use     Smoking status: Current Every Day Smoker     Packs/day: 0.25     Smokeless tobacco: Not on file   Substance and Sexual Activity     Alcohol use: Yes     Comment: daily     Drug use: No     Sexual activity: Not on file   Other Topics Concern     Not on file   Social History Narrative     Not on file     Social Determinants of Health     Financial Resource Strain: Not on file   Food Insecurity: Not on file   Transportation Needs: Not on file   Physical Activity: Not on file   Stress: Not on file   Social Connections: Not on file   Intimate Partner Violence: Not on file   Housing Stability: Not on file         FAMILY HISTORY: No family history on file.  Non-contributory for problems with anesthesia    REVIEW OF SYSTEMS:   The patient was asked a 14 point review of systems regarding constitutional symptoms, eye symptoms, ears, nose, mouth, throat symptoms, cardiovascular symptoms, respiratory symptoms, gastrointestinal symptoms, genitourinary symptoms, musculoskeletal symptoms, integumentary symptoms, neurological symptoms, psychiatric symptoms,  endocrine symptoms, hematologic/lymphatic symptoms, and allergic/ immunologic symptoms.   The pertinent factors have been included in the HPI and below.  Patient Supplied Answers to Review of Systems  No flowsheet data found.    Physical Examination   The patient underwent a physical examination as described below. The pertinent positive and negative findings are summarized after the description of the examination.  Constitutional: The patient's developmental and nutritional status was assessed. The patient's voice quality was assessed.  Head and Face: The head and face were inspected for deformities. The sinuses were palpated. The salivary glands were palpated. Facial muscle strength was assessed bilaterally.  Eyes: Extraocular movements and primary gaze alignment were assessed.  Ears, Nose, Mouth and Throat: The ears and nose were examined for deformities. The nasal septum, mucosa, and turbinates were inspected by anterior rhinoscopy. The lips, teeth, and gums were examined for abnormalities. The oral mucosa, tongue, palate, tonsils, lateral and posterior pharynx were inspected for the presence of asymmetry or mucosal lesions.    Neck: The tracheal position was noted, and the neck mass palpated to determine if there were any asymmetries, abnormal neck masses, thyromegally, or thyroid nodules.  Respiratory: The nature of the breathing and chest expansion/symmetry was observed.  Cardiovascular: The patient was examined to determine the presence of any edema or jugular venous distension.  Abdomen: The contour of the abdomen was noted.  Lymphatic: The patient was examined for infraclavicular lymphadenopathy.  Musculoskeletal: The patient was inspected for the presence of skeletal deformities.  Extremities: The extremities were examined for any clubbing or cyanosis.  Skin: The skin was examined for inflammatory or neoplastic conditions.  Neurologic: The patient's orientation, mood, and affect were noted. The cranial  nerve  functions were examined.  Other pertinent positive and negative findings on physical examination:      OC/OP: no lesions, uvula midline, soft palate elevates symmetrically, FOM/BOT soft  Neck: no lesions, no TH tenderness to palpation    All other physical examination findings were within normal limits and noncontributory.  Procedures   Flexible laryngoscopy (CPT 33358)      Pre-procedure diagnosis: throat clearing  Post-procedure diagnosis: same as above  Indication for procedure: Mr. Mcnulty is a 41 year old male with see above  Procedure(s): Fiberoptic Laryngoscopy    Details of Procedure: After informed consent was obtained, the patient was seated in the examination chair.  The areas of the nasopharynx as well as the hypopharynx were anesthetized with topical 4% lidocaine with 0.25% phenylephrine atomizer.  Examination of the base of tongue was performed first.  Attention was directed to any evidence of masses in the area or evidence of leukoplakia or candidal infection.  Attention was directed to the epiglottis where its size and position was determined and its movement on phonation of the vowel  e .  The piriform sinuses were then inspected for any mass lesions or pooling of secretions.  Attention was then directed to the larynx. The vocal folds were inspected for infection or any areas of leukoplakia, for masses, polypoid degeneration, or hemorrhage.  Having done this, the arytenoids and vocal processes were inspected for erythema or evidence of granuloma formation.  The posterior commissure was then inspected for evidence of inflammatory changes in the mucosa and heaping up of mucosal tissue. The patient was then instructed to say the vowel  e .  Adduction of vocal folds to the midline was observed for any evidence of paresis or paralysis of the larynx or asymmetry in rotation of the larynx to the left or right. The patient was asked to breathe and the degree of abduction was noted bilaterally.   Subglottic view of the larynx was obtained for any additional mass lesions or mucosal changes.  Finally the post cricoid was examined for evidence of pooling of secretions, as well as the pharyngeal wall mucosa.   Anesthesia type: 0.25% phenylephrine    Findings:  Anatomic/physiological deviations: right OMC mucus, left septal deviation, supraglottic hyperfunction   Right vocal process: No restriction of mobility   Left vocal process: No restriction of mobility  Glottal gap: Complete glottal closure  Supraglottic structures: Normal  Hypopharynx: Normal     Estimated Blood Loss: minimal  Complications: None  Disposition: Patient tolerated the procedure well                    Review of Relevant Clinical Data   I personally reviewed:  Notes: 21 - Jv Pool 20 -     Procedures: EGD 21 - esophagitis  Labs:  No results found for: TSH  Lab Results   Component Value Date     06/10/2021    CO2 25 06/10/2021    BUN 11 06/10/2021     Lab Results   Component Value Date    WBC 5.8 06/10/2021    HGB 12.9 (L) 06/10/2021    HCT 37.7 (L) 06/10/2021    MCV 99 06/10/2021     06/10/2021     Lab Results   Component Value Date    PTT 27 2006    INR 1.01 2006     No results found for: MARK  No components found for: RHEUMATOIDFACTOR,  RF  No results found for: CRP  No components found for: CKTOT, URICACID  No components found for: C3, C4, DSDNAAB, NDNAABIFA  No results found for: MPOAB    Patient reported Quality of Life (QOL) Measures   Patient Supplied Answers To VHI Questionnaire  No flowsheet data found.      Patient Supplied Answers To EAT Questionnaire  No flowsheet data found.      Patient Supplied Answers To CSI Questionnaire  No flowsheet data found.         Impression & Plan     IMPRESSION: Mr. Mcnulty is a 41 year old male who is being seen for the followin. Chronic throat clearing   - started 1.5 years ago   - has worsened overtime  - associated with sensation of mucus  stuck in the back of his nose   - quit 1.5 years ago cigarettes  - allergy triggers and work up: denies  - pulmonary triggers and work up: sometimes worse with physical activity  - GI triggers and work up: does have a history of EGD with esophagitis, but no reflux symptoms, working with [facility], no AM LPRD  - ENT triggers and work up: associated with nasal symptoms  - scope shows nasal findings of right OMC mucus and septal deviation  - symptoms likely due to nasal etiology and with irritable larynx syndrome added on top given his daily throat clearing  Plan  - nasal saline lavage followed by flonase   - CT sinus and follow up with Dr Mack  - if no improvement after nasal work up - needs throat clearing suppression therapy      RETURN VISIT: as needed after rhinology work up     Thank you for the kind referral and for allowing me to share in the care of Mr. Mcnulty. If you have any questions, please do not hesitate to contact me.    Vita Patel MD    Laryngology    [facility]  Department of  Otolaryngology - Head and Neck Surgery  Clinics & Surgery Center  [address] [place] [address]  Appointment line: [phone]  Fax: [phone]  [email or web address]

## 2021-12-03 NOTE — PROGRESS NOTES
Willis Mcnulty is a 41 year old male who is being evaluated via a billable video visit.      The patient has been notified and verbally consented to the following:     This video visit will be conducted between you and your provider.    Patient has opted to conduct today's video visit vs an in-person appointment, and is not able to attend due to possible exposure to COVID-19.      If during the course of the call the provider feels a video visit is not appropriate, you will not be charged for this service.    Call initiated at: ***  Type of Video Platform Used: Project Airplane  Location of provider: Residence  Location of patient: [facility] VOICE [facility]IC  Evaluation report    Clinician: Tex Marroquin M.M., M.A., CCC/SLP  Seen in conjunction with: Dr. Patel  Referring physician:   ***  Patient: Willis Mcnulty  Date of Visit: 12/3/2021    HISTORY  Chief complaint: Willis Mcnulty is a 41 year old *** presenting today for evaluation of ***.    Onset: {Evaluation - onset:094976}  Inciting incident: ***  Course: {Evaluation - course:244960}  Salient history: He has a history significant for ***.    ***[facility] KADY    CURRENT SYMPTOMS INCLUDE  ***VOICE    ***    ***COUGH/THROAT CLEARING    ***     his cough/ throat clearing triggers include:  o ***    ***SWALLOWING    ***    ***BREATHING    ***    ***ADDITIONAL    ***    Patient denies significant {[facility] Basic Symptoms:319641}.     OTHER PERTINENT HISTORY    {HISTORY:394603593}    No past medical history on file.  Past Surgical History:   Procedure Laterality Date     ORTHOPEDIC SURGERY         OBJECTIVE  PATIENT REPORTED MEASURES    {[facility] PATIENT REPORTED MEASURES:473911}  Patient Supplied Answers To VHI Questionnaire  No flowsheet data found.    Patient Supplied Answers To CSI Questionnaire  No flowsheet data found.    Patient Supplied Answers To EAT Questionnaire  No flowsheet data found.        PERCEPTUAL EVALUATION (CPT 81732)  POSTURE / TENSION:     {Perceptual  "Tension:851803}    BREATHING:     {Perceptual Breathin}    LARYNGEAL PALPATION:     {Laryngeal Palpation:127869}    VOICE:    Roughness: {[facility] VOICE SEVERITY RATINGS:996512}    Breathiness: {[facility] VOICE SEVERITY RATINGS:570239}    Strain: {[facility] VOICE SEVERITY RATINGS:490260}    ***{Other Perceptual Categories:142200}    Loudness    Conversational speech:  {Perceptual Loudness:201636}    Projected speech:  {Perceptual Loudness:102901}    Pitch:    Conversational speech:  {Perceptual Pitch:617612}    Pitch glide: ***    Resonance:    Conversational speech:  {Perceptual Resonance:319683}    Singing vs. Speech:  ***    CAPE-V Overall Severity:  ***/100    COUGH/THROAT CLEARING:    {[facility] SX-COUGH:987637}    THERAPY PROBES: Improvement was elicited with {Therapy Probe Response:922970}    ***    ASSESSMENT / PLAN  IMPRESSIONS: Willis Mcnulty is presenting today with {[facility]-ICD-10 CODES:628571} in the context of {[facility]-ICD-10 CODES:631154}. ***{DYSPHONIA:730073541}.    STIMULABILITY: results of therapy probes during perceptual and laryngeal evaluation demonstrate improvement with {Therapy Probe Response:358430}    RECOMMENDATIONS:     {Therapy recommendations:800133}    He demonstrates a {PROGNOSIS:133861965::\"Good\"} prognosis for improvement given adherence to therapeutic recommendations.     Positive indicators: {[facility] Positive Prognostic Indicators:949050}    Negative indicators: ***    DURATION / FREQUENCY: *** one-hour sessions    Research: This patient is ***willing to be contacted about participation in research. May be eligible for *** study.    ***GOALS:  Patient goal:   1. {GOALS:584334469}    Short-term goal(s): Within the first 4 sessions, Mr. Mcnulty:  1. [facility]68[facility]    Long-term goal(s): In *** months, Mr. Mcnulty will:  1. {LONG TERM GOALS:571816711}    ***  G-Codes:  {[facility]-G-CODES LIST:515184}  Certification period: 2021 - 22    This treatment plan was developed with " "the patient who agreed with the recommendations.    ***SAMEDAY THERAPY NOTE GOES HERE, USE .DWSPTXSAMEDAY***    TOTAL SERVICE TIME: *** minutes  {LOS:608483::\"NO CHARGE FACILITY FEE (91687)\"}    Tex Marroquin M.M., M.A., CCC-SLP  Speech-Language Pathologist  Certificate of Vocology  [phone]    *this report was created in part through the use of computerized dictation software, and though reviewed following completion, some typographic errors may persist.  If there is confusion regarding any of this notes contents, please contact me for clarification.*    "

## 2021-12-03 NOTE — LETTER
12/3/2021      RE: Willis Mcnulty  [address]  [facility] [address]       Willis Mcnulty is a 41 year old male who is being evaluated via a billable video visit.      Willis has been notified and verbally consented to the following:     This video visit will be conducted between you and your provider.    Patient has opted to conduct today's video visit vs an in-person appointment.     Video visits are billed at different rates depending on your insurance coverage. Please reach out to your insurance provider with any questions.     If during the course of the call the provider feels the appointment is not appropriate, you will not be charged for this service.  Provider has received verbal consent for billable virtual visit from the patient? Yes  Will anyone else be joining your video visit? This is a joint visit with Dr. Patel, who is seeing the patient in the clinic    Call initiated at: 3:15 PM   Type of Visit Platform Used: [employer] Video  Location of provider: Home  Location of patient: [facility]  Brennan Cooper Jr., M.D., F.A.C.S.  Laura Tate M.D., M.P.H.  Vita Patel M.D.  Malia Milton, Ph.D., CCC-SLP  Ernesto Ochoa, Ph.D., [facility]-SLP  Leti Moreno M.M. (voice), M.A., CCC-SLP  Elan Marroquin M.M. (voice), M.A., CCC-SLP  ZANA Red (voice), M.S., CCC-SLP    [facility]  VOICE/SPEECH/BREATHING EVALUATION AND LARYNGEAL EXAMINATION REPORT    Patient: Willis Mcnulty  Date of Visit: 12/3/2021    Clinician: Malia Milton, Ph.D., CCC/SLP  Seen in conjunction with: Dr. Vita Patel    CHIEF COMPLAINT:  Throat clearing    HISTORY  Willis Mcnulty is a 41 year old presenting today for evaluation of throat-clearing.    Please refer to Dr. Patel s dictation for a more complete history and impressions.   Salient details of his history are as follows:    Onset: sudden onset one day in April 2020, started waking up with very thick phlegm in the back of his  "throat; no obvious precipitating factors    He brings up a wad of mucus that is extremely thick    It can gag him, and cause \"dry heaves\"    Clears his throat all day long because it feels like there is something there    He can cough something up, and it is white and pasty    This happens a couple times a day, though it is now less frequent and more random    He feels post nasal drainage; sometimes he will try to just swallow    He had less posterior symptoms when he had an URI and everything was coming out the front    He was doing nasal lavage for a while 5-6 months ago, every night for a month; it didn't do much    Has tried prescribed nasal sprays    There is nothing else that triggers the throat-clear; if he doesn't feel the drainage, he doesn't need to clear    He can go hours without clearing, especially if he is distracted    Sometimes less when he is exerting, but other times exertion triggers it    He feels that now the irritation is probably caused by the throat-clearing itself    An ENT told him it was reflux, but GI workup in the spring shows only a little irritation in the esophagus    Referred here because reflux didn't account for all the symptoms    He stopped taking Nexium about a month ago; Sxs have improved, but he's not sure if this is related    The Nexium wasn't helping     DIAGNOSIS/REASON FOR REFERRAL  Throat-clearing/ Evaluate, perform laryngeal exam, treat as appropriate    CURRENT SYMPTOMS INCLUDE:  THROAT CLEARING    Very frequent and irritating    This has caused him to have worse gag reflux    It bothers him a lot   VOICE    Heavy vocal demand    Voice use does not trigger the throat clearing    Talking is more likely to keep him from having Sxs for a while  SWALLOWING    No problem  ADDITIONAL    Mild reflux; using the lifestyle precautions    No breathing problems    OTHER PERTINENT HISTORY    Unknown; please refer to Dr. Patel's note    OBJECTIVE FINDINGS  VOICE/ SPEECH/ " NON-COMMUNICATIVE LARYNGEAL BEHAVIORS EVALUATION  An evaluation of the voice and throat clearing was accomplished today; salient features are as follows:    Palpation of the laryngeal area shows:    Mild tenderness of the left thyrohyoid area     Throat clear:    Occasional; rough and noisy, but not wet or productive    The throat clear is brief    Locus of cough/ throat clear: sounds consistent with upper airway    Moderate in severity    Breathing pattern:    Appears within normal limits and adequate     No overt tension is evident.    Voice quality is characterized by    Pressed, throaty, squeezed    Mild backward resonance    Habitual pitch is within normal limits, at around A2    Loudness is WNL and appropriate for the setting    LARYNGEAL EXAMINATION    Endoscopic laryngeal examination was performed by:  Dr. Patel  I provided the protocol of instructions for the patient.  Type of exam:   Flexible endoscopy with chip-tip technology    This exam shows:    Laryngeal and Vocal Fold Mucosa    Essentially healthy laryngeal mucosa    Presence of secretions is unremarkable    Status of vocal fold mucosa:   o Within normal limits, with no lesions and straight vibratory margins    Neurological and Functional Integrity of the Larynx    Vocal folds are mobile and meet at midline; movement is brisk and symmetric; exam is neurologically normal     Some incoordination is evident during a task of 20 quickly repeated vowels    Elongation of the vocal folds for pitch increase is within normal limits    On phonation, glottic closure is within normal limits    Supraglottic Function and Therapy Probes    Severe four-way constriction of the supraglottic larynx during connected speech  o The epiglottis touches the posterior pharyngeal wall on most phonation   o At the same time, there is significant hooding of the arytenoids    Supraglottic function during singing is somewhat improved    Therapy probes show   o Reduction in  supraglottic hyperfunction during tasks that involved lateral conversational speech, singing, and forward resonance maneuvers with improved airflow    Stroboscopy was not warranted.    Dr. Patel and I reviewed this laryngeal exam with Mr. Mcnulty today, and I provided pertinent explanations:    Endoscopic findings do not entirely account for patient Hx/complaints.    Dr. Patel is suspicious that it is indeed postnasal drainage that is causing the throat clearing    There is also most likely a component of irritable larynx syndrome; Mr. Mcnulty himself has suggested this    We have agreed to start with nasal lavage and the steroid nasal spray for a month to see if the symptoms resolve    I provided rationale for irritable larynx syndrome, and also explained the role of muscular hyperfunction and possible laryngeal irritation    We agreed that we would do a course of functional speech therapy if his symptoms persists after medical treatment    ASSESSMENT / PLAN  IMPRESSIONS:  This evaluation has resulted in the following diagnosis/diagnoses for Mr. Mcnulty  Chronic Throat Clearing (R68.89)  Dysphonia (R49.0)     Laryngeal evaluation demonstrated marked to severe muscular hyperfunction    Perceptual evaluation demonstrated pressed voice quality, as well as a rough and noisy throat clear  STIMULABILITY: results of therapy probes during perceptual and laryngeal evaluation demonstrate improvement with use of forward resonant stimuli  RECOMMENDATIONS:     Although there is evidence of muscular hyperfunction during phonation, Mr. Interiano throat clearing does not seem related to his voice use, and does seem potentially related to post nasal drip.  Therefore Dr. Patel has recommended a month of diligent nasal lavage and a steroid nasal spray.  I have provided rationale for the possibility of irritable larynx, and also the role of muscular hyperfunction in his throat clearing.    If he does not improve after a month of medical  intervention, we will initiate speech therapy.    Research: This patient was not approached about participation in research.       I will see Loida Hamlet when he returns to see Dr. Patel, to see if we should initiate speech therapy.    TOTAL SERVICE TIME: 48 minutes  EVALUATION OF VOICE AND RESONANCE (47153)  NO CHARGE FACILITY FEE       Malia Milton, Ph.D., [facility]-SLP  Speech-Language Pathologist  Director, [facility]  [phone]

## 2021-12-03 NOTE — PATIENT INSTRUCTIONS
1.  You were seen in the ENT Clinic today by . If you have any questions or concerns after your appointment, please call [phone]. Press option #1 for scheduling related needs. Press option #3 for Nurse advice.    2.   has recommended  the following:   - netipot rinses 2 times a day, instructions below.   - follow the netipot rinses with Flonase, 2 sprays each nostril 2 times a day.   - CT scan of the sinuses, to be done prior to seeing    - Follow up with  in 5 weeks    Neti Pot Instructions:    * Leaning over a sink, tilt your head sideways with your forehead and chin roughly level to avoid liquid flowing into your mouth.  * Breathing through your open mouth, insert the spout of the saline-filled container into your upper nostril so that the liquid drains through the lower nostril  * Clear your nostrils, then repeat the procedure, tilting your head sideways, on the other side.      3.  Plan is to return to clinic to see , as needed.      Maria Dolores Novoa LPN  [phone]  [facility] - Otolaryngology

## 2021-12-03 NOTE — LETTER
12/3/2021       RE: Willis Mcnulty  [address]  [facility] [address]     Dear Colleague,    Thank you for referring your patient, Willis Mcnulty, to the [facility] VOICE CLINIC [place] [facility]. Please see a copy of my visit note below.    Willis Mcnulty is a 41 year old male who is being evaluated via a billable video visit.      Willis has been notified and verbally consented to the following:     This video visit will be conducted between you and your provider.    Patient has opted to conduct today's video visit vs an in-person appointment.     Video visits are billed at different rates depending on your insurance coverage. Please reach out to your insurance provider with any questions.     If during the course of the call the provider feels the appointment is not appropriate, you will not be charged for this service.  Provider has received verbal consent for billable virtual visit from the patient? Yes  Will anyone else be joining your video visit? This is a joint visit with Dr. Patel, who is seeing the patient in the clinic    Call initiated at: 3:15 PM   Type of Visit Platform Used: [employer] Video  Location of provider: Home  Location of patient: [facility] VOICE [facility]  Brennan Cooper Jr., M.D., F.A.C.S.  Laura Tate M.D., M.P.H.  Vita Patel M.D.  Malia Milton, Ph.D., CCC-SLP  Ernesto Ochoa, Ph.D., CCC-SLP  Leti Moreno M.M. (voice), M.A., CCC-SLP  Elan Marroquin M.M. (voice), M.A., CCC-SLP  ZANA Red (voice), M.S., CCC-SLP    [facility]  VOICE/SPEECH/BREATHING EVALUATION AND LARYNGEAL EXAMINATION REPORT    Patient: Willis Mcnulty  Date of Visit: 12/3/2021    Clinician: Malia Milton, Ph.D., CCC/SLP  Seen in conjunction with: Dr. Vita Patel    CHIEF COMPLAINT:  Throat clearing    HISTORY  Willis Mcnulty is a 41 year old presenting today for evaluation of throat-clearing.    Please  "refer to Dr. Patel s dictation for a more complete history and impressions.   Salient details of his history are as follows:    Onset: sudden onset one day in April 2020, started waking up with very thick phlegm in the back of his throat; no obvious precipitating factors    He brings up a wad of mucus that is extremely thick    It can gag him, and cause \"dry heaves\"    Clears his throat all day long because it feels like there is something there    He can cough something up, and it is white and pasty    This happens a couple times a day, though it is now less frequent and more random    He feels post nasal drainage; sometimes he will try to just swallow    He had less posterior symptoms when he had an URI and everything was coming out the front    He was doing nasal lavage for a while 5-6 months ago, every night for a month; it didn't do much    Has tried prescribed nasal sprays    There is nothing else that triggers the throat-clear; if he doesn't feel the drainage, he doesn't need to clear    He can go hours without clearing, especially if he is distracted    Sometimes less when he is exerting, but other times exertion triggers it    He feels that now the irritation is probably caused by the throat-clearing itself    An ENT told him it was reflux, but GI workup in the spring shows only a little irritation in the esophagus    Referred here because reflux didn't account for all the symptoms    He stopped taking Nexium about a month ago; Sxs have improved, but he's not sure if this is related    The Nexium wasn't helping     DIAGNOSIS/REASON FOR REFERRAL  Throat-clearing/ Evaluate, perform laryngeal exam, treat as appropriate    CURRENT SYMPTOMS INCLUDE:  THROAT CLEARING    Very frequent and irritating    This has caused him to have worse gag reflux    It bothers him a lot   VOICE    Heavy vocal demand    Voice use does not trigger the throat clearing    Talking is more likely to keep him from having Sxs for a " while  SWALLOWING    No problem  ADDITIONAL    Mild reflux; using the lifestyle precautions    No breathing problems    OTHER PERTINENT HISTORY    Unknown; please refer to Dr. Patel's note    OBJECTIVE FINDINGS  VOICE/ SPEECH/ NON-COMMUNICATIVE LARYNGEAL BEHAVIORS EVALUATION  An evaluation of the voice and throat clearing was accomplished today; salient features are as follows:    Palpation of the laryngeal area shows:    Mild tenderness of the left thyrohyoid area     Throat clear:    Occasional; rough and noisy, but not wet or productive    The throat clear is brief    Locus of cough/ throat clear: sounds consistent with upper airway    Moderate in severity    Breathing pattern:    Appears within normal limits and adequate     No overt tension is evident.    Voice quality is characterized by    Pressed, throaty, squeezed    Mild backward resonance    Habitual pitch is within normal limits, at around A2    Loudness is WNL and appropriate for the setting    LARYNGEAL EXAMINATION    Endoscopic laryngeal examination was performed by:  Dr. Patel  I provided the protocol of instructions for the patient.  Type of exam:   Flexible endoscopy with chip-tip technology    This exam shows:    Laryngeal and Vocal Fold Mucosa    Essentially healthy laryngeal mucosa    Presence of secretions is unremarkable    Status of vocal fold mucosa:   o Within normal limits, with no lesions and straight vibratory margins    Neurological and Functional Integrity of the Larynx    Vocal folds are mobile and meet at midline; movement is brisk and symmetric; exam is neurologically normal     Some incoordination is evident during a task of 20 quickly repeated vowels    Elongation of the vocal folds for pitch increase is within normal limits    On phonation, glottic closure is within normal limits    Supraglottic Function and Therapy Probes    Severe four-way constriction of the supraglottic larynx during connected speech  o The epiglottis touches  the posterior pharyngeal wall on most phonation   o At the same time, there is significant hooding of the arytenoids    Supraglottic function during singing is somewhat improved    Therapy probes show   o Reduction in supraglottic hyperfunction during tasks that involved lateral conversational speech, singing, and forward resonance maneuvers with improved airflow    Stroboscopy was not warranted.    Dr. Patel and I reviewed this laryngeal exam with Mr. Mcnulty today, and I provided pertinent explanations:    Endoscopic findings do not entirely account for patient Hx/complaints.    Dr. Patel is suspicious that it is indeed postnasal drainage that is causing the throat clearing    There is also most likely a component of irritable larynx syndrome; Mr. Mcnulty himself has suggested this    We have agreed to start with nasal lavage and the steroid nasal spray for a month to see if the symptoms resolve    I provided rationale for irritable larynx syndrome, and also explained the role of muscular hyperfunction and possible laryngeal irritation    We agreed that we would do a course of functional speech therapy if his symptoms persists after medical treatment    ASSESSMENT / PLAN  IMPRESSIONS:  This evaluation has resulted in the following diagnosis/diagnoses for Mr. Mcnulty  Chronic Throat Clearing (R68.89)  Dysphonia (R49.0)     Laryngeal evaluation demonstrated marked to severe muscular hyperfunction    Perceptual evaluation demonstrated pressed voice quality, as well as a rough and noisy throat clear  STIMULABILITY: results of therapy probes during perceptual and laryngeal evaluation demonstrate improvement with use of forward resonant stimuli  RECOMMENDATIONS:     Although there is evidence of muscular hyperfunction during phonation, Mr. Interiano throat clearing does not seem related to his voice use, and does seem potentially related to post nasal drip.  Therefore Dr. Patel has recommended a month of diligent nasal lavage  and a steroid nasal spray.  I have provided rationale for the possibility of irritable larynx, and also the role of muscular hyperfunction in his throat clearing.    If he does not improve after a month of medical intervention, we will initiate speech therapy.    Research: This patient was not approached about participation in research.       I will see Mr. Mcnulty when he returns to see Dr. Patel, to see if we should initiate speech therapy.    TOTAL SERVICE TIME: 48 minutes  EVALUATION OF VOICE AND RESONANCE ([id number])  NO CHARGE FACILITY FEE       Malia Milton, Ph.D., [facility] [place]-SLP  Speech-Language Pathologist  Director, [facility]  [phone]              Again, thank you for allowing me to participate in the care of your patient.      Sincerely,    Malia Milton, SLP

## 2021-12-03 NOTE — PROGRESS NOTES
Willis Mcnulty is a 41 year old male who is being evaluated via a billable video visit.      Willis has been notified and verbally consented to the following:     This video visit will be conducted between you and your provider.    Patient has opted to conduct today's video visit vs an in-person appointment.     Video visits are billed at different rates depending on your insurance coverage. Please reach out to your insurance provider with any questions.     If during the course of the call the provider feels the appointment is not appropriate, you will not be charged for this service.  Provider has received verbal consent for billable virtual visit from the patient? Yes  Will anyone else be joining your video visit? This is a joint visit with Dr. Patel, who is seeing the patient in the clinic    Call initiated at: 3:15 PM   Type of Visit Platform Used: Helios  Location of provider: Home  Location of patient: [facility]  Brennan Cooper Jr., M.D., F.A.C.S.  Laura Tate M.D., M.P.H.  Vita Patel M.D.  Malia Milton, Ph.D., CCC-SLP  Ernesto Ochoa, Ph.D., [facility]SLP  Leti Moreno M.M. (voice), M.A., CCC-SLP  Elan Marroquin M.M. (voice), M.A., CCC-SLP  ZANA Red (voice), M.S., CCC-SLP    [facility]  VOICE/SPEECH/BREATHING EVALUATION AND LARYNGEAL EXAMINATION REPORT    Patient: Willis Mcnulty  Date of Visit: 12/3/2021    Clinician: Malia Milton, Ph.D., CCC/SLP  Seen in conjunction with: Dr. Vita Patel    CHIEF COMPLAINT:  Throat clearing    HISTORY  Willis Mcnulty is a 41 year old presenting today for evaluation of throat-clearing.    Please refer to Dr. Patel s dictation for a more complete history and impressions.   Salient details of his history are as follows:    Onset: sudden onset one day in April 2020, started waking up with very thick phlegm in the back of his throat; no obvious precipitating factors    He brings up a wad of mucus that is  "extremely thick    It can gag him, and cause \"dry heaves\"    Clears his throat all day long because it feels like there is something there    He can cough something up, and it is white and pasty    This happens a couple times a day, though it is now less frequent and more random    He feels post nasal drainage; sometimes he will try to just swallow    He had less posterior symptoms when he had an URI and everything was coming out the front    He was doing nasal lavage for a while 5-6 months ago, every night for a month; it didn't do much    Has tried prescribed nasal sprays    There is nothing else that triggers the throat-clear; if he doesn't feel the drainage, he doesn't need to clear    He can go hours without clearing, especially if he is distracted    Sometimes less when he is exerting, but other times exertion triggers it    He feels that now the irritation is probably caused by the throat-clearing itself    An ENT told him it was reflux, but GI workup in the spring shows only a little irritation in the esophagus    Referred here because reflux didn't account for all the symptoms    He stopped taking Nexium about a month ago; Sxs have improved, but he's not sure if this is related    The Nexium wasn't helping     DIAGNOSIS/REASON FOR REFERRAL  Throat-clearing/ Evaluate, perform laryngeal exam, treat as appropriate    CURRENT SYMPTOMS INCLUDE:  THROAT CLEARING    Very frequent and irritating    This has caused him to have worse gag reflux    It bothers him a lot   VOICE    Heavy vocal demand    Voice use does not trigger the throat clearing    Talking is more likely to keep him from having Sxs for a while  SWALLOWING    No problem  ADDITIONAL    Mild reflux; using the lifestyle precautions    No breathing problems    OTHER PERTINENT HISTORY    Unknown; please refer to Dr. Patel's note    OBJECTIVE FINDINGS  VOICE/ SPEECH/ NON-COMMUNICATIVE LARYNGEAL BEHAVIORS EVALUATION  An evaluation of the voice and throat " clearing was accomplished today; salient features are as follows:    Palpation of the laryngeal area shows:    Mild tenderness of the left thyrohyoid area     Throat clear:    Occasional; rough and noisy, but not wet or productive    The throat clear is brief    Locus of cough/ throat clear: sounds consistent with upper airway    Moderate in severity    Breathing pattern:    Appears within normal limits and adequate     No overt tension is evident.    Voice quality is characterized by    Pressed, throaty, squeezed    Mild backward resonance    Habitual pitch is within normal limits, at around A2    Loudness is WNL and appropriate for the setting    LARYNGEAL EXAMINATION    Endoscopic laryngeal examination was performed by:  Dr. Patel  I provided the protocol of instructions for the patient.  Type of exam:   Flexible endoscopy with chip-tip technology    This exam shows:    Laryngeal and Vocal Fold Mucosa    Essentially healthy laryngeal mucosa    Presence of secretions is unremarkable    Status of vocal fold mucosa:   o Within normal limits, with no lesions and straight vibratory margins    Neurological and Functional Integrity of the Larynx    Vocal folds are mobile and meet at midline; movement is brisk and symmetric; exam is neurologically normal     Some incoordination is evident during a task of 20 quickly repeated vowels    Elongation of the vocal folds for pitch increase is within normal limits    On phonation, glottic closure is within normal limits    Supraglottic Function and Therapy Probes    Severe four-way constriction of the supraglottic larynx during connected speech  o The epiglottis touches the posterior pharyngeal wall on most phonation   o At the same time, there is significant hooding of the arytenoids    Supraglottic function during singing is somewhat improved    Therapy probes show   o Reduction in supraglottic hyperfunction during tasks that involved lateral conversational speech, singing, and  forward resonance maneuvers with improved airflow    Stroboscopy was not warranted.    Dr. Patel and I reviewed this laryngeal exam with Mr. Mcnulty today, and I provided pertinent explanations:    Endoscopic findings do not entirely account for patient Hx/complaints.    Dr. Patel is suspicious that it is indeed postnasal drainage that is causing the throat clearing    There is also most likely a component of irritable larynx syndrome; Mr. Mcnulty himself has suggested this    We have agreed to start with nasal lavage and the steroid nasal spray for a month to see if the symptoms resolve    I provided rationale for irritable larynx syndrome, and also explained the role of muscular hyperfunction and possible laryngeal irritation    We agreed that we would do a course of functional speech therapy if his symptoms persists after medical treatment    ASSESSMENT / PLAN  IMPRESSIONS:  This evaluation has resulted in the following diagnosis/diagnoses for Mr. Mcnulty  Chronic Throat Clearing (R68.89)  Dysphonia (R49.0)     Laryngeal evaluation demonstrated marked to severe muscular hyperfunction    Perceptual evaluation demonstrated pressed voice quality, as well as a rough and noisy throat clear  STIMULABILITY: results of therapy probes during perceptual and laryngeal evaluation demonstrate improvement with use of forward resonant stimuli  RECOMMENDATIONS:     Although there is evidence of muscular hyperfunction during phonation, Mr. Interiano throat clearing does not seem related to his voice use, and does seem potentially related to post nasal drip.  Therefore Dr. Patle has recommended a month of diligent nasal lavage and a steroid nasal spray.  I have provided rationale for the possibility of irritable larynx, and also the role of muscular hyperfunction in his throat clearing.    If he does not improve after a month of medical intervention, we will initiate speech therapy.    Research: This patient was not approached about  participation in research.       I will see Mr. Mcnulty when he returns to see Dr. Patel, to see if we should initiate speech therapy.    TOTAL SERVICE TIME: 48 minutes  EVALUATION OF VOICE AND RESONANCE ([id number])  NO CHARGE FACILITY FEE       Malia Milton, Ph.D., [facility]-SLP  Speech-Language Pathologist  Director, [facility]  [phone]

## 2021-12-03 NOTE — NURSING NOTE
"Chief Complaint   Patient presents with     Consult       Pulse 61, height 1.905 m (6' 3\"), weight 99.3 kg (219 lb), SpO2 98 %.    Janie Holguin, EMT    "

## 2021-12-06 NOTE — TELEPHONE ENCOUNTER
FUTURE VISIT INFORMATION      FUTURE VISIT INFORMATION:    Date: 1/7/22    Time: 2:30 PM; CT Prior    Location: [facility] [place]  REFERRAL INFORMATION:    Referring provider: Dr. Vita Patel    Referring providers clinic: [facility] - ENT    Reason for visit/diagnosis: Sinus Issues    RECORDS REQUESTED FROM:       Clinic name Comments Records Status Imaging Status   [facility] 12/3/21 - ENT OV with Dr. Patel [facility] 7/23/20 - ENT OV with Dr. Gallo [facility] 7/22/20 - [facility] TV with Dr. Ramos [facility] Everywhere    [facility] - Imaging 9/12/19 - MRI [facility] Everywhere PACs

## 2022-01-06 NOTE — TELEPHONE ENCOUNTER
Documentation in another encounter.    Erroneous entry.    ZULMA BLAKE LPN on 1/6/2022 at 2:18 PM

## 2022-01-06 NOTE — TELEPHONE ENCOUNTER
Previous Medications: Flonase    Previous Imaging: MRI 09/21/19    Provider recommendations: 12/3/21 ([facility]) Chronic throat clearing - started 1.5 years ago - has worsened overtime - associated with sensation of mucus stuck in the back of his nose  - quit 1.5 years ago cigarettes - allergy triggers and work up: denies  - pulmonary triggers and work up: sometimes worse with physical activity - GI triggers and work up: does have a history of EGD with esophagitis, but no reflux symptoms, working with [facility], no AM LPRD  - ENT triggers and work up: associated with nasal symptoms  - scope shows nasal findings of right OMC mucus and septal deviation  - symptoms likely due to nasal etiology and with irritable larynx syndrome added on top given his daily throat clearing  Plan  - nasal saline lavage followed by flonase   - CT sinus and follow up with Dr Mack  - if no improvement after nasal work up - needs throat clearing suppression therapy    Additional info: CT sinus at 1:40 pm same day    ZULMA BLAKE LPN on 1/6/2022 at 12:39 PM

## 2022-01-07 ENCOUNTER — PRE VISIT (OUTPATIENT)
Dept: OTOLARYNGOLOGY | Facility: CLINIC | Age: 42
End: 2022-01-07

## 2022-02-25 ENCOUNTER — OFFICE VISIT (OUTPATIENT)
Dept: OTOLARYNGOLOGY | Facility: CLINIC | Age: 42
End: 2022-02-25
Payer: COMMERCIAL

## 2022-02-25 ENCOUNTER — ANCILLARY PROCEDURE (OUTPATIENT)
Dept: CT IMAGING | Facility: CLINIC | Age: 42
End: 2022-02-25
Attending: OTOLARYNGOLOGY
Payer: COMMERCIAL

## 2022-02-25 VITALS
DIASTOLIC BLOOD PRESSURE: 91 MMHG | SYSTOLIC BLOOD PRESSURE: 150 MMHG | HEART RATE: 50 BPM | OXYGEN SATURATION: 100 % | WEIGHT: 215 LBS | HEIGHT: 75 IN | TEMPERATURE: 99 F | BODY MASS INDEX: 26.73 KG/M2

## 2022-02-25 DIAGNOSIS — J34.89 SINUS DRAINAGE: Primary | ICD-10-CM

## 2022-02-25 DIAGNOSIS — J34.89 SINUS DRAINAGE: ICD-10-CM

## 2022-02-25 PROCEDURE — 31231 NASAL ENDOSCOPY DX: CPT | Performed by: OTOLARYNGOLOGY

## 2022-02-25 PROCEDURE — 70486 CT MAXILLOFACIAL W/O DYE: CPT | Performed by: RADIOLOGY

## 2022-02-25 PROCEDURE — 99213 OFFICE O/P EST LOW 20 MIN: CPT | Mod: 25 | Performed by: OTOLARYNGOLOGY

## 2022-02-25 ASSESSMENT — PAIN SCALES - GENERAL: PAINLEVEL: NO PAIN (0)

## 2022-02-25 NOTE — NURSING NOTE
"Chief Complaint   Patient presents with     Consult     consult for sinus issues, ref by [facility]    Blood pressure (!) 150/91, pulse 50, temperature 99  F (37.2  C), temperature source Temporal, height 1.905 m (6' 3\"), weight 97.5 kg (215 lb), SpO2 100 %. Maria Del Rosario Uribe, EMT  "

## 2022-02-25 NOTE — LETTER
2/25/2022       RE: Willis Mcnulty  [address]  [place] [address]     Dear Colleague,    Thank you for referring your patient, Willis Mcnulty, to the [facility] [place] at . Please see a copy of my visit note below.      [facility]  New Patient Visit      Encounter date:   February 25, 2022    Referring Provider:   Vita Patel MD  [address] [place] [address]    Chief Complaint: sinus issues    History of Present Illness:   Willis Mcnulty is a 41 year old male who presents for consultation regarding sinus issues. The patient was seen by Dr. Patel on 12/03/2021 and reported he woke up in April with the sensation of something caught in the back of his nose. He was referred here for further evaluation.    Today, the patient reports thick buildup where his sinuses meet the back of his throat for the past 1.5 years. He states this is hard to get out, but always comes out eventually. The patient reports it is sometimes the size of a marble and notes he has to hack it out for 20-30 minutes. He states he is able to blow discharge out of his nose at times. The patient states he has been using Flonase and sinus rinses intermittently when he remembers. He reports he has been using a humidifier with some relief. The patient denies any trouble breathing through his nose. He reports he has been told he has acid reflux.     [place] Operative History:  No history of sinus surgery.     Review of systems: A 14-point review of systems has been conducted and was negative for any notable symptoms, except as dictated in the history of present illness.     Medical History:  No past medical history on file.     Surgical History:   Past Surgical History:   Procedure Laterality Date     ORTHOPEDIC SURGERY          Family History:  No family history on file.     Social History:   Social History     Socioeconomic  "History     Marital status: Single     Spouse name: Not on file     Number of children: Not on file     Years of education: Not on file     Highest education level: Not on file   Occupational History     Not on file   Tobacco Use     Smoking status: Current Every Day Smoker     Packs/day: 0.25     Smokeless tobacco: Not on file   Substance and Sexual Activity     Alcohol use: Yes     Comment: daily     Drug use: No     Sexual activity: Not on file   Other Topics Concern     Not on file   Social History Narrative     Not on file     Social Determinants of Health     Financial Resource Strain: Not on file   Food Insecurity: Not on file   Transportation Needs: Not on file   Physical Activity: Not on file   Stress: Not on file   Social Connections: Not on file   Intimate Partner Violence: Not on file   Housing Stability: Not on file        Physical Exam:  Vital signs: BP (!) 150/91 (BP Location: Left arm, Patient Position: Sitting, Cuff Size: Adult Large)   Pulse 50   Temp 99  F (37.2  C) (Temporal)   Ht 1.905 m (6' 3\")   Wt 97.5 kg (215 lb)   SpO2 100%   BMI 26.87 kg/m     General Appearance: No acute distress, appropriate demeanor, conversant  Eyes: moist conjunctivae; EOMI; pupils symmetric; visual acuity grossly intact; no proptosis  Head: normocephalic; overall symmetric appearance without deformity  Face: overall symmetric without deformity; HB I/VI  Nose: No external deformity; septum deviated to the left causing greater than 70% obstruction; inferior turbinates without significant hypertrophy  Lungs: symmetric chest rise; no wheezing  CV: Good distal perfusion; normal heart rate  Extremities: No deformity  Neurologic Exam: Cranial nerves II-XII are grossly intact; no focal deficit      Procedure Note  Procedure performed: Rigid nasal endoscopy  Indication: To evaluate for sinonasal pathology not visualized on routine anterior rhinoscopy  Anesthesia: 4% topical lidocaine with 0.05% " oxymetazoline  Description of procedure: A 30 degree, 3 mm rigid endoscope was inserted into bilateral nasal cavities and the nasal valves, nasal cavity, middle meatus, sphenoethmoid recess, and nasopharynx were thoroughly evaluated for evidence of obstruction, edema, purulence, polyps and/or mass/lesion.     [place]-Candelario Endoscopic Scoring System  Endoscopic observation Right Left   Polyps in middle meatus (0 = absent, 1 = restricted to middle meatus, 2 = Beyond middle meatus) 0 0   Discharge (0 = absent, 1 = thin and clear, 2 = thick, purulent) 0 0   Edema (0 = absent, 1 = mild-moderate, 2 = moderate-severe) 0 0   Crusting (0 = absent, 1 = mild-moderate, 2 = moderate-severe) 0 0   Scarring (0= absent, 1 = mild-moderate, 2 = moderate-severe) 0 0   Total 0 0     Findings  RT: MM and SER are clear, nasopharynx is clear  LT: significant septal deviation, MM and SER are clear, nasopharynx is clear    The patient tolerated the procedure well without complication.     Laboratory Review:  n/a    Imaging Review:  CT Facial Bones 02/25/2022  No evid of sinusitis  LT septal deviation    Pathology Review:  n/a    Assessment/Medical Decision Making:  Post-nasal drip  Chronic cough/throat clearning  Deviated nasal septum      Plan:  His exam today appears stable and I do not think his sinuses are the cause of his symptoms. We discussed surgical intervention to repair his significant septal deviation if he has trouble breathing on that side. I noted that acid reflux may be contributing to his symptoms. He can continue Flonase and saline rinses for the next 4-6 weeks and follow up as needed.     Edwin Mack MD    [facility]  Rhinology  Endoscopic Skull Base Surgery  [facility]  Department of Otolaryngology - Head & Neck Surgery    Scribe Disclosure:  I, Vira Moran, am serving as a scribe to document services personally performed by Edwin Mack MD at this visit, based  upon the provider's statements to me. All documentation has been reviewed by the aforementioned provider prior to being entered into the official medical record.       Again, thank you for allowing me to participate in the care of your patient.      Sincerely,    Edwin Mack MD

## 2022-02-25 NOTE — PATIENT INSTRUCTIONS
"1. You were seen in the clinic today by Dr. Mack. If you have any questions or concerns after your appointment, please call the clinic at [phone]. Press \"1\" for scheduling, press \"3\" for nurse advice.    2.   The following has been recommended for you based upon your appointment today:   - 2 sprays of Flonase in each nostril nightly.    3.   Plan to return the clinic as needed.       Brandi Singh LPN  [facility]  Department of Otolaryngology  [phone]    "

## 2022-02-25 NOTE — PROGRESS NOTES
[facility]  New Patient Visit      Encounter date:   February 25, 2022    Referring Provider:   Vita Patel MD  [address] [place] [address]    Chief Complaint: sinus issues    History of Present Illness:   Willis Mcnulty is a 41 year old male who presents for consultation regarding sinus issues. The patient was seen by Dr. Patel on 12/03/2021 and reported he woke up in April with the sensation of something caught in the back of his nose. He was referred here for further evaluation.    Today, the patient reports thick buildup where his sinuses meet the back of his throat for the past 1.5 years. He states this is hard to get out, but always comes out eventually. The patient reports it is sometimes the size of a marble and notes he has to hack it out for 20-30 minutes. He states he is able to blow discharge out of his nose at times. The patient states he has been using Flonase and sinus rinses intermittently when he remembers. He reports he has been using a humidifier with some relief. The patient denies any trouble breathing through his nose. He reports he has been told he has acid reflux.     [place] Operative History:  No history of sinus surgery.     Review of systems: A 14-point review of systems has been conducted and was negative for any notable symptoms, except as dictated in the history of present illness.     Medical History:  No past medical history on file.     Surgical History:   Past Surgical History:   Procedure Laterality Date     ORTHOPEDIC SURGERY          Family History:  No family history on file.     Social History:   Social History     Socioeconomic History     Marital status: Single     Spouse name: Not on file     Number of children: Not on file     Years of education: Not on file     Highest education level: Not on file   Occupational History     Not on file   Tobacco Use     Smoking status: Current Every Day Smoker     Packs/day: 0.25     Smokeless tobacco: Not on  "file   Substance and Sexual Activity     Alcohol use: Yes     Comment: daily     Drug use: No     Sexual activity: Not on file   Other Topics Concern     Not on file   Social History Narrative     Not on file     Social Determinants of Health     Financial Resource Strain: Not on file   Food Insecurity: Not on file   Transportation Needs: Not on file   Physical Activity: Not on file   Stress: Not on file   Social Connections: Not on file   Intimate Partner Violence: Not on file   Housing Stability: Not on file        Physical Exam:  Vital signs: BP (!) 150/91 (BP Location: Left arm, Patient Position: Sitting, Cuff Size: Adult Large)   Pulse 50   Temp 99  F (37.2  C) (Temporal)   Ht 1.905 m (6' 3\")   Wt 97.5 kg (215 lb)   SpO2 100%   BMI 26.87 kg/m     General Appearance: No acute distress, appropriate demeanor, conversant  Eyes: moist conjunctivae; EOMI; pupils symmetric; visual acuity grossly intact; no proptosis  Head: normocephalic; overall symmetric appearance without deformity  Face: overall symmetric without deformity; HB I/VI  Nose: No external deformity; septum deviated to the left causing greater than 70% obstruction; inferior turbinates without significant hypertrophy  Lungs: symmetric chest rise; no wheezing  CV: Good distal perfusion; normal heart rate  Extremities: No deformity  Neurologic Exam: Cranial nerves II-XII are grossly intact; no focal deficit      Procedure Note  Procedure performed: Rigid nasal endoscopy  Indication: To evaluate for sinonasal pathology not visualized on routine anterior rhinoscopy  Anesthesia: 4% topical lidocaine with 0.05% oxymetazoline  Description of procedure: A 30 degree, 3 mm rigid endoscope was inserted into bilateral nasal cavities and the nasal valves, nasal cavity, middle meatus, sphenoethmoid recess, and nasopharynx were thoroughly evaluated for evidence of obstruction, edema, purulence, polyps and/or mass/lesion.     [place]-Candelario Endoscopic Scoring " System  Endoscopic observation Right Left   Polyps in middle meatus (0 = absent, 1 = restricted to middle meatus, 2 = Beyond middle meatus) 0 0   Discharge (0 = absent, 1 = thin and clear, 2 = thick, purulent) 0 0   Edema (0 = absent, 1 = mild-moderate, 2 = moderate-severe) 0 0   Crusting (0 = absent, 1 = mild-moderate, 2 = moderate-severe) 0 0   Scarring (0= absent, 1 = mild-moderate, 2 = moderate-severe) 0 0   Total 0 0     Findings  RT: MM and SER are clear, nasopharynx is clear  LT: significant septal deviation, MM and SER are clear, nasopharynx is clear    The patient tolerated the procedure well without complication.     Laboratory Review:  n/a    Imaging Review:  CT Facial Bones 02/25/2022  No evid of sinusitis  LT septal deviation    Pathology Review:  n/a    Assessment/Medical Decision Making:  Post-nasal drip  Chronic cough/throat clearning  Deviated nasal septum      Plan:  His exam today appears stable and I do not think his sinuses are the cause of his symptoms. We discussed surgical intervention to repair his significant septal deviation if he has trouble breathing on that side. I noted that acid reflux may be contributing to his symptoms. He can continue Flonase and saline rinses for the next 4-6 weeks and follow up as needed.     Edwin Mack MD    [facility]  Rhinology  Endoscopic Skull Base Surgery  [facility]  Department of Otolaryngology - Head & Neck Surgery    Scribe Disclosure:  I, Vira Moran, am serving as a scribe to document services personally performed by Edwin Mack MD at this visit, based upon the provider's statements to me. All documentation has been reviewed by the aforementioned provider prior to being entered into the official medical record.

## 2022-09-11 ENCOUNTER — HOSPITAL ENCOUNTER (EMERGENCY)
Facility: CLINIC | Age: 42
Discharge: HOME | End: 2022-09-12
Attending: EMERGENCY MEDICINE | Admitting: EMERGENCY MEDICINE
Payer: COMMERCIAL

## 2022-09-11 VITALS
TEMPERATURE: 97.7 F | RESPIRATION RATE: 16 BRPM | HEART RATE: 70 BPM | HEIGHT: 75 IN | OXYGEN SATURATION: 96 % | SYSTOLIC BLOOD PRESSURE: 143 MMHG | DIASTOLIC BLOOD PRESSURE: 94 MMHG | BODY MASS INDEX: 27.23 KG/M2 | WEIGHT: 219 LBS

## 2022-09-11 DIAGNOSIS — F10.929 ALCOHOLIC INTOXICATION WITH COMPLICATION (H): ICD-10-CM

## 2022-09-11 LAB
ALCOHOL BREATH TEST: 0.27 (ref 0–0.01)
ALCOHOL BREATH TEST: 0.36 (ref 0–0.01)

## 2022-09-11 PROCEDURE — 99283 EMERGENCY DEPT VISIT LOW MDM: CPT | Performed by: EMERGENCY MEDICINE

## 2022-09-11 PROCEDURE — 82075 ASSAY OF BREATH ETHANOL: CPT | Performed by: EMERGENCY MEDICINE

## 2022-09-11 PROCEDURE — 250N000013 HC RX MED GY IP 250 OP 250 PS 637: Performed by: EMERGENCY MEDICINE

## 2022-09-11 RX ORDER — CARVEDILOL 12.5 MG/1
12.5 TABLET ORAL ONCE
Status: COMPLETED | OUTPATIENT
Start: 2022-09-11 | End: 2022-09-11

## 2022-09-11 RX ADMIN — CARVEDILOL 12.5 MG: 12.5 TABLET, FILM COATED ORAL at 22:32

## 2022-09-11 ASSESSMENT — ENCOUNTER SYMPTOMS
COUGH: 0
ABDOMINAL PAIN: 0
DIARRHEA: 0
NAUSEA: 0
FEVER: 0
SHORTNESS OF BREATH: 0
VOMITING: 0

## 2022-09-11 ASSESSMENT — ACTIVITIES OF DAILY LIVING (ADL)
ADLS_ACUITY_SCORE: 35

## 2022-09-11 NOTE — ED TRIAGE NOTES
"Pt states his head is \"messed up\". Pt last drank an hour ago and states feeling depressed. Pt drinks aprox 4-7 drinks a day.  Pt denies SI/HI and hallucinations/paranoia. Pt states he may feel dehydrated and occasional nausea.       "

## 2022-09-12 NOTE — ED NOTES
Patient wishes to discharge. Provider notified. Girlfriend called to  patient. Patient walked out of ED and got into vehicle.

## 2022-09-12 NOTE — DISCHARGE INSTRUCTIONS
Avoid drinking to excess. If you feel you need detox, return to the ER. Follow up with your clinic doctor this week.

## 2022-09-12 NOTE — ED NOTES
Pt stated he drank a little too much today and wanted to make sure he was okay, so came in here.  Pt is not interested in detox.

## 2022-09-12 NOTE — ED PROVIDER NOTES
"ED Provider Note  [facility] EMERGENCY DEPARTMENT ([facility])    9/11/22          History     Chief Complaint   Patient presents with     Alcohol Intoxication     Depression     HPI  Willis Mcnulty is a 41 year old male who presents to the emergency department today stating, \"I drank too much.\"  He states that he drank about 6 beers and maybe 3 shots today.  He states he felt like he got too drunk, and his head was feeling weird because of that, so he decided to come in.  He is not complaining of any pain.  Denies any nausea, vomiting, diarrhea, abdominal pain, fever, cough, shortness of breath.  Denies any suicidal ideation.  States he feels like perhaps he is little a dry/dehydrated.  He states that he just felt weird and did not want to be home alone because he thought he was too drunk.  He states he is not interested in detox.  Denies any other substance use today.     This part of the medical record was transcribed by Johanne Angelo Medical Scribe, from a dictation done by Jasmyne Lora MD.     Past Medical History  No past medical history on file.  Past Surgical History:   Procedure Laterality Date     ORTHOPEDIC SURGERY       carvedilol (COREG) 6.25 MG tablet  LORazepam (ATIVAN) 0.5 MG tablet  losartan (COZAAR) 100 MG tablet  IBUPROFEN PO  oxyCODONE-acetaminophen (PERCOCET) 5-325 MG per tablet      No Known Allergies  Family History  No family history on file.  Social History   Social History     Tobacco Use     Smoking status: Former Smoker   Substance Use Topics     Alcohol use: Yes     Comment: 4-7 drinks per day (2022)     Drug use: Yes     Types: Marijuana     Comment: occasional      Past medical history, past surgical history, medications, allergies, family history, and social history were reviewed with the patient. No additional pertinent items.       Review of Systems   Constitutional: Negative for fever.   Respiratory: Negative for cough and " "shortness of breath.    Gastrointestinal: Negative for abdominal pain, diarrhea, nausea and vomiting.   Psychiatric/Behavioral: Negative for suicidal ideas.   All other systems reviewed and are negative.    A complete review of systems was performed with pertinent positives and negatives noted in the HPI, and all other systems negative.    Physical Exam   BP: (!) 168/110  Pulse: 81  Temp: 97.8  F (36.6  C)  Resp: 16  Height: 190.5 cm (6' 3\")  Weight: 99.3 kg (219 lb)  SpO2: 98 %  Physical Exam  Constitutional:       General: He is not in acute distress.     Appearance: He is not diaphoretic.   HENT:      Head: Atraumatic.   Eyes:      General: No scleral icterus.  Cardiovascular:      Heart sounds: Normal heart sounds.   Pulmonary:      Effort: No respiratory distress.      Breath sounds: Normal breath sounds.   Abdominal:      Palpations: Abdomen is soft.      Tenderness: There is no abdominal tenderness.   Musculoskeletal:         General: No deformity.   Skin:     General: Skin is warm.         ED Course      Procedures                     Results for orders placed or performed during the hospital encounter of 09/11/22   Alcohol breath test POCT     Status: Abnormal   Result Value Ref Range    Alcohol Breath Test 0.365 (A) 0.00 - 0.01   Alcohol breath test POCT     Status: Abnormal   Result Value Ref Range    Alcohol Breath Test 0.269 (A) 0.00 - 0.01     Medications   carvedilol (COREG) tablet 12.5 mg (12.5 mg Oral Given 9/11/22 2232)        Assessments & Plan (with Medical Decision Making)   The patient states he is not interested in detox.  He is monitored in the ED for several hours.  He states he feels much better.  He was able to find a sober ride.  He is encouraged to avoid drinking to excess, return if he changes mind and feels he like to pursue detox.  Is encouraged to follow-up with primary care.  He verbalizes understanding and is agreeable to the plan.    Dictation Disclaimer: Some of this Note has " been completed with voice-recognition dictation software. Although errors are generally corrected real-time, there is the potential for a rare error to be present in the completed chart.      I have reviewed the nursing notes. I have reviewed the findings, diagnosis, plan and need for follow up with the patient.    New Prescriptions    No medications on file       Final diagnoses:   Alcoholic intoxication with complication (H)     I, Johanne Angelo, am serving as a trained medical scribe to document services personally performed by Jasmyne Lora MD based on the provider's statements to me on September 11, 2022.  This document has been checked and approved by the attending provider.    I, Jasmyne Lora MD, was physically present and have reviewed and verified the accuracy of this note documented by Johanne Angelo, medical scribe.      --  Jasmyne Lora  [facility] EMERGENCY DEPARTMENT  9/11/2022     Jasmyne Lora MD  09/12/22 0016

## 2023-01-29 ENCOUNTER — HOSPITAL ENCOUNTER (EMERGENCY)
Facility: CLINIC | Age: 43
Discharge: HOME | End: 2023-01-29
Attending: STUDENT IN AN ORGANIZED HEALTH CARE EDUCATION/TRAINING PROGRAM | Admitting: STUDENT IN AN ORGANIZED HEALTH CARE EDUCATION/TRAINING PROGRAM
Payer: COMMERCIAL

## 2023-01-29 ENCOUNTER — APPOINTMENT (OUTPATIENT)
Dept: MRI IMAGING | Facility: CLINIC | Age: 43
End: 2023-01-29
Attending: STUDENT IN AN ORGANIZED HEALTH CARE EDUCATION/TRAINING PROGRAM
Payer: COMMERCIAL

## 2023-01-29 VITALS
BODY MASS INDEX: 27.25 KG/M2 | RESPIRATION RATE: 16 BRPM | WEIGHT: 218 LBS | DIASTOLIC BLOOD PRESSURE: 73 MMHG | TEMPERATURE: 98.2 F | SYSTOLIC BLOOD PRESSURE: 120 MMHG | OXYGEN SATURATION: 99 % | HEART RATE: 50 BPM

## 2023-01-29 DIAGNOSIS — R20.0 RIGHT ARM NUMBNESS: ICD-10-CM

## 2023-01-29 DIAGNOSIS — R41.0 CONFUSION: ICD-10-CM

## 2023-01-29 DIAGNOSIS — R10.84 ABDOMINAL PAIN, GENERALIZED: ICD-10-CM

## 2023-01-29 LAB
ALBUMIN SERPL-MCNC: 4.3 G/DL (ref 3.4–5)
ALBUMIN UR-MCNC: NEGATIVE MG/DL
ALP SERPL-CCNC: 37 U/L (ref 40–150)
ALT SERPL W P-5'-P-CCNC: 71 U/L (ref 0–70)
ANION GAP SERPL CALCULATED.3IONS-SCNC: 6 MMOL/L (ref 3–14)
APPEARANCE UR: CLEAR
AST SERPL W P-5'-P-CCNC: 33 U/L (ref 0–45)
ATRIAL RATE - MUSE: 56 BPM
BASOPHILS # BLD AUTO: 0.1 10E3/UL (ref 0–0.2)
BASOPHILS NFR BLD AUTO: 2 %
BILIRUB SERPL-MCNC: 0.4 MG/DL (ref 0.2–1.3)
BILIRUB UR QL STRIP: NEGATIVE
BUN SERPL-MCNC: 17 MG/DL (ref 7–30)
CALCIUM SERPL-MCNC: 9.4 MG/DL (ref 8.5–10.1)
CHLORIDE BLD-SCNC: 107 MMOL/L (ref 94–109)
CO2 SERPL-SCNC: 25 MMOL/L (ref 20–32)
COLOR UR AUTO: NORMAL
CREAT SERPL-MCNC: 1.01 MG/DL (ref 0.66–1.25)
DIASTOLIC BLOOD PRESSURE - MUSE: NORMAL MMHG
EOSINOPHIL # BLD AUTO: 0.4 10E3/UL (ref 0–0.7)
EOSINOPHIL NFR BLD AUTO: 4 %
ERYTHROCYTE [DISTWIDTH] IN BLOOD BY AUTOMATED COUNT: 11.9 % (ref 10–15)
GFR SERPL CREATININE-BSD FRML MDRD: >90 ML/MIN/1.73M2
GLUCOSE BLD-MCNC: 94 MG/DL (ref 70–99)
GLUCOSE UR STRIP-MCNC: NEGATIVE MG/DL
HCT VFR BLD AUTO: 37.7 % (ref 40–53)
HGB BLD-MCNC: 12.7 G/DL (ref 13.3–17.7)
HGB UR QL STRIP: NEGATIVE
HOLD SPECIMEN: NORMAL
HOLD SPECIMEN: NORMAL
IMM GRANULOCYTES # BLD: 0 10E3/UL
IMM GRANULOCYTES NFR BLD: 0 %
INTERPRETATION ECG - MUSE: NORMAL
KETONES UR STRIP-MCNC: NEGATIVE MG/DL
LEUKOCYTE ESTERASE UR QL STRIP: NEGATIVE
LIPASE SERPL-CCNC: 272 U/L (ref 73–393)
LYMPHOCYTES # BLD AUTO: 2.7 10E3/UL (ref 0.8–5.3)
LYMPHOCYTES NFR BLD AUTO: 29 %
MCH RBC QN AUTO: 34.2 PG (ref 26.5–33)
MCHC RBC AUTO-ENTMCNC: 33.7 G/DL (ref 31.5–36.5)
MCV RBC AUTO: 102 FL (ref 78–100)
MONOCYTES # BLD AUTO: 0.9 10E3/UL (ref 0–1.3)
MONOCYTES NFR BLD AUTO: 10 %
NEUTROPHILS # BLD AUTO: 5.2 10E3/UL (ref 1.6–8.3)
NEUTROPHILS NFR BLD AUTO: 55 %
NITRATE UR QL: NEGATIVE
NRBC # BLD AUTO: 0 10E3/UL
NRBC BLD AUTO-RTO: 0 /100
P AXIS - MUSE: 23 DEGREES
PH UR STRIP: 5 [PH] (ref 5–7)
PLATELET # BLD AUTO: 364 10E3/UL (ref 150–450)
POTASSIUM BLD-SCNC: 3.7 MMOL/L (ref 3.4–5.3)
PR INTERVAL - MUSE: 132 MS
PROT SERPL-MCNC: 8 G/DL (ref 6.8–8.8)
QRS DURATION - MUSE: 100 MS
QT - MUSE: 422 MS
QTC - MUSE: 407 MS
R AXIS - MUSE: 5 DEGREES
RBC # BLD AUTO: 3.71 10E6/UL (ref 4.4–5.9)
RBC URINE: 1 /HPF
SODIUM SERPL-SCNC: 138 MMOL/L (ref 133–144)
SP GR UR STRIP: 1.01 (ref 1–1.03)
SYSTOLIC BLOOD PRESSURE - MUSE: NORMAL MMHG
T AXIS - MUSE: 5 DEGREES
TROPONIN I SERPL HS-MCNC: 5 NG/L
UROBILINOGEN UR STRIP-MCNC: NORMAL MG/DL
VENTRICULAR RATE- MUSE: 56 BPM
WBC # BLD AUTO: 9.3 10E3/UL (ref 4–11)
WBC URINE: 0 /HPF

## 2023-01-29 PROCEDURE — 84484 ASSAY OF TROPONIN QUANT: CPT | Performed by: STUDENT IN AN ORGANIZED HEALTH CARE EDUCATION/TRAINING PROGRAM

## 2023-01-29 PROCEDURE — 70544 MR ANGIOGRAPHY HEAD W/O DYE: CPT

## 2023-01-29 PROCEDURE — 80053 COMPREHEN METABOLIC PANEL: CPT | Performed by: STUDENT IN AN ORGANIZED HEALTH CARE EDUCATION/TRAINING PROGRAM

## 2023-01-29 PROCEDURE — 255N000002 HC RX 255 OP 636: Performed by: STUDENT IN AN ORGANIZED HEALTH CARE EDUCATION/TRAINING PROGRAM

## 2023-01-29 PROCEDURE — 99285 EMERGENCY DEPT VISIT HI MDM: CPT | Mod: 25 | Performed by: STUDENT IN AN ORGANIZED HEALTH CARE EDUCATION/TRAINING PROGRAM

## 2023-01-29 PROCEDURE — 93010 ELECTROCARDIOGRAM REPORT: CPT | Performed by: STUDENT IN AN ORGANIZED HEALTH CARE EDUCATION/TRAINING PROGRAM

## 2023-01-29 PROCEDURE — 70549 MR ANGIOGRAPH NECK W/O&W/DYE: CPT

## 2023-01-29 PROCEDURE — 70553 MRI BRAIN STEM W/O & W/DYE: CPT

## 2023-01-29 PROCEDURE — 85025 COMPLETE CBC W/AUTO DIFF WBC: CPT | Performed by: STUDENT IN AN ORGANIZED HEALTH CARE EDUCATION/TRAINING PROGRAM

## 2023-01-29 PROCEDURE — 83690 ASSAY OF LIPASE: CPT | Performed by: STUDENT IN AN ORGANIZED HEALTH CARE EDUCATION/TRAINING PROGRAM

## 2023-01-29 PROCEDURE — 36415 COLL VENOUS BLD VENIPUNCTURE: CPT | Performed by: STUDENT IN AN ORGANIZED HEALTH CARE EDUCATION/TRAINING PROGRAM

## 2023-01-29 PROCEDURE — 81001 URINALYSIS AUTO W/SCOPE: CPT | Performed by: STUDENT IN AN ORGANIZED HEALTH CARE EDUCATION/TRAINING PROGRAM

## 2023-01-29 PROCEDURE — 93005 ELECTROCARDIOGRAM TRACING: CPT | Performed by: STUDENT IN AN ORGANIZED HEALTH CARE EDUCATION/TRAINING PROGRAM

## 2023-01-29 PROCEDURE — A9585 GADOBUTROL INJECTION: HCPCS | Performed by: STUDENT IN AN ORGANIZED HEALTH CARE EDUCATION/TRAINING PROGRAM

## 2023-01-29 RX ORDER — GADOBUTROL 604.72 MG/ML
0.1 INJECTION INTRAVENOUS ONCE
Status: COMPLETED | OUTPATIENT
Start: 2023-01-29 | End: 2023-01-29

## 2023-01-29 RX ORDER — CLONAZEPAM 0.5 MG/1
TABLET ORAL
COMMUNITY
Start: 2023-01-10

## 2023-01-29 RX ADMIN — GADOBUTROL 9.89 ML: 604.72 INJECTION INTRAVENOUS at 21:42

## 2023-01-29 ASSESSMENT — ACTIVITIES OF DAILY LIVING (ADL): ADLS_ACUITY_SCORE: 35

## 2023-01-30 NOTE — DISCHARGE INSTRUCTIONS
You are seen today with concern for confusion and right arm numbness.  Your MRI was negative for stroke or any other issue.  You have also been having abdominal pain but your test results today are reassuring.  You do have mild anemia which is similar to your prior.  Please follow-up with your primary doctor next week.  Return to the emergency department with new or worsening symptoms.

## 2023-01-30 NOTE — ED PROVIDER NOTES
[facility] EMERGENCY DEPARTMENT ([facility])     January 29, 2023     History     Chief Complaint   Patient presents with     Altered Mental Status     Abdominal Pain     HPI  Willis Mcnulty is a 42 year old male with a past medical history including CAD in native artery, HLD, anxiety who presents to the Emergency Department for evaluation of acute episode of confusion, speech difficulty and right arm numbness.  Patient reports he was sitting watching TV when he suddenly noted difficulty with speech and numbness in his right arm.  Family told him he seemed confused as well.  This episode lasted for approximately 20 minutes.  Reports this is happened once previously but he was not evaluated for this in the past.  Reports he was laid off his job and had approximately 1 week of heavy drinking but had weaned himself off of alcohol.  Last drink was almost a week ago now.  Since that time he has also been having mild abdominal discomfort, but no nausea or vomiting.  Has not had any fevers, chills or other infectious symptoms.  Is not having any chest pain or difficulty breathing.        Past Medical History  No past medical history on file.  Past Surgical History:   Procedure Laterality Date     ORTHOPEDIC SURGERY       carvedilol (COREG) 6.25 MG tablet  clonazePAM (KLONOPIN) 0.5 MG tablet  losartan (COZAAR) 100 MG tablet  IBUPROFEN PO  oxyCODONE-acetaminophen (PERCOCET) 5-325 MG per tablet      No Known Allergies  Family History  No family history on file.  Social History   Social History     Tobacco Use     Smoking status: Former   Substance Use Topics     Alcohol use: Yes     Comment: 4-7 drinks per day (2022)     Drug use: Yes     Types: Marijuana     Comment: occasional      Past medical history, past surgical history, medications, allergies, family history, and social history were reviewed with the patient. No additional pertinent items.      A medically appropriate review of systems was performed with  pertinent positives and negatives noted in the HPI, and all other systems negative.    Physical Exam   BP: 135/81  Pulse: 78  Temp: 98.2  F (36.8  C)  Resp: 16  Weight: 98.9 kg (218 lb)  SpO2: 99 %  Physical Exam  General: no acute distress. Appears stated age.   HENT: MMM, no oropharyngeal lesions  Eyes: PERRL, normal sclerae  Neck: non-tender, supple  Cardio: Regular rate. Regular rhythm. Extremities well perfused  Resp: Normal work of breathing, normal respiratory rate.  Chest/Back: no visual signs of trauma, no CVA tenderness  Abdomen: no tenderness, non-distended, no rebound, no guarding  Neuro: alert and fully oriented. CN II-XII grossly intact. Grossly normal strength and sensation in all extremities.   MSK: no deformities. Grossly normal ROM in extremities.   Integumentary/Skin: no rash visualized, normal color  Psych: normal affect, normal behavior    ED Course, Procedures, & Data      Procedures       ED Course Selections:        EKG Interpretation:      Interpreted by Lisbeth Obrien MD  Time reviewed: 20:20  Symptoms at time of EKG: none   Rhythm: normal sinus   Rate: bradycardia  Axis: normal  Ectopy: none  Conduction: normal  ST Segments/ T Waves: No ST-T wave changes  Q Waves: none  Comparison to prior: Unchanged    Clinical Impression: normal EKG, bradycardia                     Results for orders placed or performed during the hospital encounter of 01/29/23   MR Brain w/o & w Contrast     Status: None    Narrative    EXAM: MRA BRAIN ([place]) W/O CONTRAST, MRA NECK (CAROTIDS) W/O and W CONTRAST, MR BRAIN W/O and W CONTRAST  LOCATION: [facility]  DATE/TIME: 1/29/2023 9:41 PM    INDICATION: Altered mental status, rule out TIA  COMPARISON: 09/12/2019  CONTRAST:   TECHNIQUE:   1) Routine multiplanar multisequence head MRI without and with intravenous contrast.  2) 3D time-of-flight head MRA without intravenous contrast.  3) Neck MRA without and with IV  contrast. Stenosis measurements made according to NASCET criteria unless otherwise specified.    FINDINGS:  HEAD MRI:  INTRACRANIAL CONTENTS: No acute or subacute infarct. No mass, acute hemorrhage, or extra-axial fluid collections. A single foci of nonspecific T2/FLAIR hyperintensity within the white matter of doubtful clinical significance and are within the range of   expected for a patient of this age. Signal intensity of the brain parenchyma is otherwise normal. Normal ventricles and sulci. Normal position of the cerebellar tonsils. No pathologic contrast enhancement.    SELLA: No abnormality accounting for technique.    OSSEOUS STRUCTURES/SOFT TISSUES: Normal marrow signal. The major intracranial vascular flow voids are maintained.     ORBITS: No abnormality accounting for technique.     SINUSES/MASTOIDS: No paranasal sinus mucosal disease. No middle ear or mastoid effusion.     HEAD MRA:   ANTERIOR CIRCULATION: No stenosis/occlusion, aneurysm, or high flow vascular malformation. Standard [place] anatomy.    POSTERIOR CIRCULATION: No stenosis/occlusion, aneurysm, or high flow vascular malformation. Dominant left vertebral artery supplies the basilar artery with a small right vertebral artery supplying the right posterior inferior cerebellar artery (PICA).     NECK MRA:   RIGHT CAROTID: No measurable stenosis or dissection.    LEFT CAROTID: No measurable stenosis or dissection.    VERTEBRAL ARTERIES: No focal stenosis or dissection. Dominant left and smaller right vertebral arteries.    AORTIC ARCH: Classic aortic arch anatomy with no significant stenosis at the origin of the great vessels.      Impression    IMPRESSION:  HEAD MRI:   1.  Negative for acute intracranial abnormality.    HEAD MRA:   1.  Normal MRA [place].    NECK MRA:  1.  Normal neck MRA.   MRA Brain ([place]) wo Contrast     Status: None    Narrative    EXAM: MRA BRAIN ([place]) W/O CONTRAST, MRA NECK  (CAROTIDS) W/O and W CONTRAST, MR BRAIN W/O and W CONTRAST  LOCATION: [facility]  DATE/TIME: 1/29/2023 9:41 PM    INDICATION: Altered mental status, rule out TIA  COMPARISON: 09/12/2019  CONTRAST:   TECHNIQUE:   1) Routine multiplanar multisequence head MRI without and with intravenous contrast.  2) 3D time-of-flight head MRA without intravenous contrast.  3) Neck MRA without and with IV contrast. Stenosis measurements made according to NASCET criteria unless otherwise specified.    FINDINGS:  HEAD MRI:  INTRACRANIAL CONTENTS: No acute or subacute infarct. No mass, acute hemorrhage, or extra-axial fluid collections. A single foci of nonspecific T2/FLAIR hyperintensity within the white matter of doubtful clinical significance and are within the range of   expected for a patient of this age. Signal intensity of the brain parenchyma is otherwise normal. Normal ventricles and sulci. Normal position of the cerebellar tonsils. No pathologic contrast enhancement.    SELLA: No abnormality accounting for technique.    OSSEOUS STRUCTURES/SOFT TISSUES: Normal marrow signal. The major intracranial vascular flow voids are maintained.     ORBITS: No abnormality accounting for technique.     SINUSES/MASTOIDS: No paranasal sinus mucosal disease. No middle ear or mastoid effusion.     HEAD MRA:   ANTERIOR CIRCULATION: No stenosis/occlusion, aneurysm, or high flow vascular malformation. Standard [place] anatomy.    POSTERIOR CIRCULATION: No stenosis/occlusion, aneurysm, or high flow vascular malformation. Dominant left vertebral artery supplies the basilar artery with a small right vertebral artery supplying the right posterior inferior cerebellar artery (PICA).     NECK MRA:   RIGHT CAROTID: No measurable stenosis or dissection.    LEFT CAROTID: No measurable stenosis or dissection.    VERTEBRAL ARTERIES: No focal stenosis or dissection. Dominant left and smaller right vertebral  arteries.    AORTIC ARCH: Classic aortic arch anatomy with no significant stenosis at the origin of the great vessels.      Impression    IMPRESSION:  HEAD MRI:   1.  Negative for acute intracranial abnormality.    HEAD MRA:   1.  Normal MRA [place].    NECK MRA:  1.  Normal neck MRA.   MRA Neck (Carotids) wo & w Contrast     Status: None    Narrative    EXAM: MRA BRAIN ([place]) W/O CONTRAST, MRA NECK (CAROTIDS) W/O and W CONTRAST, MR BRAIN W/O and W CONTRAST  LOCATION: [facility]  DATE/TIME: 1/29/2023 9:41 PM    INDICATION: Altered mental status, rule out TIA  COMPARISON: 09/12/2019  CONTRAST:   TECHNIQUE:   1) Routine multiplanar multisequence head MRI without and with intravenous contrast.  2) 3D time-of-flight head MRA without intravenous contrast.  3) Neck MRA without and with IV contrast. Stenosis measurements made according to NASCET criteria unless otherwise specified.    FINDINGS:  HEAD MRI:  INTRACRANIAL CONTENTS: No acute or subacute infarct. No mass, acute hemorrhage, or extra-axial fluid collections. A single foci of nonspecific T2/FLAIR hyperintensity within the white matter of doubtful clinical significance and are within the range of   expected for a patient of this age. Signal intensity of the brain parenchyma is otherwise normal. Normal ventricles and sulci. Normal position of the cerebellar tonsils. No pathologic contrast enhancement.    SELLA: No abnormality accounting for technique.    OSSEOUS STRUCTURES/SOFT TISSUES: Normal marrow signal. The major intracranial vascular flow voids are maintained.     ORBITS: No abnormality accounting for technique.     SINUSES/MASTOIDS: No paranasal sinus mucosal disease. No middle ear or mastoid effusion.     HEAD MRA:   ANTERIOR CIRCULATION: No stenosis/occlusion, aneurysm, or high flow vascular malformation. Standard [place] anatomy.    POSTERIOR CIRCULATION: No stenosis/occlusion,  aneurysm, or high flow vascular malformation. Dominant left vertebral artery supplies the basilar artery with a small right vertebral artery supplying the right posterior inferior cerebellar artery (PICA).     NECK MRA:   RIGHT CAROTID: No measurable stenosis or dissection.    LEFT CAROTID: No measurable stenosis or dissection.    VERTEBRAL ARTERIES: No focal stenosis or dissection. Dominant left and smaller right vertebral arteries.    AORTIC ARCH: Classic aortic arch anatomy with no significant stenosis at the origin of the great vessels.      Impression    IMPRESSION:  HEAD MRI:   1.  Negative for acute intracranial abnormality.    HEAD MRA:   1.  Normal MRA [place].    NECK MRA:  1.  Normal neck MRA.   UA with Microscopic reflex to Culture     Status: Normal    Specimen: Urine, Midstream   Result Value Ref Range    Color Urine Straw Colorless, Straw, Light Yellow, Yellow    Appearance Urine Clear Clear    Glucose Urine Negative Negative mg/dL    Bilirubin Urine Negative Negative    Ketones Urine Negative Negative mg/dL    Specific Gravity Urine 1.009 1.003 - 1.035    Blood Urine Negative Negative    pH Urine 5.0 5.0 - 7.0    Protein Albumin Urine Negative Negative mg/dL    Urobilinogen Urine Normal Normal, 2.0 mg/dL    Nitrite Urine Negative Negative    Leukocyte Esterase Urine Negative Negative    RBC Urine 1 <=2 /HPF    WBC Urine 0 <=5 /HPF    Narrative    Urine Culture not indicated   Comprehensive metabolic panel     Status: Abnormal   Result Value Ref Range    Sodium 138 133 - 144 mmol/L    Potassium 3.7 3.4 - 5.3 mmol/L    Chloride 107 94 - 109 mmol/L    Carbon Dioxide (CO2) 25 20 - 32 mmol/L    Anion Gap 6 3 - 14 mmol/L    Urea Nitrogen 17 7 - 30 mg/dL    Creatinine 1.01 0.66 - 1.25 mg/dL    Calcium 9.4 8.5 - 10.1 mg/dL    Glucose 94 70 - 99 mg/dL    Alkaline Phosphatase 37 (L) 40 - 150 U/L    AST 33 0 - 45 U/L    ALT 71 (H) 0 - 70 U/L    Protein Total 8.0 6.8 - 8.8 g/dL    Albumin 4.3 3.4 - 5.0  g/dL    Bilirubin Total 0.4 0.2 - 1.3 mg/dL    GFR Estimate >90 >60 mL/min/1.73m2   Lipase     Status: Normal   Result Value Ref Range    Lipase 272 73 - 393 U/L   Troponin I     Status: Normal   Result Value Ref Range    Troponin I High Sensitivity 5 <79 ng/L   CBC with platelets and differential     Status: Abnormal   Result Value Ref Range    WBC Count 9.3 4.0 - 11.0 10e3/uL    RBC Count 3.71 (L) 4.40 - 5.90 10e6/uL    Hemoglobin 12.7 (L) 13.3 - 17.7 g/dL    Hematocrit 37.7 (L) 40.0 - 53.0 %     (H) 78 - 100 fL    MCH 34.2 (H) 26.5 - 33.0 pg    MCHC 33.7 31.5 - 36.5 g/dL    RDW 11.9 10.0 - 15.0 %    Platelet Count 364 150 - 450 10e3/uL    % Neutrophils 55 %    % Lymphocytes 29 %    % Monocytes 10 %    % Eosinophils 4 %    % Basophils 2 %    % Immature Granulocytes 0 %    NRBCs per 100 WBC 0 <1 /100    Absolute Neutrophils 5.2 1.6 - 8.3 10e3/uL    Absolute Lymphocytes 2.7 0.8 - 5.3 10e3/uL    Absolute Monocytes 0.9 0.0 - 1.3 10e3/uL    Absolute Eosinophils 0.4 0.0 - 0.7 10e3/uL    Absolute Basophils 0.1 0.0 - 0.2 10e3/uL    Absolute Immature Granulocytes 0.0 <=0.4 10e3/uL    Absolute NRBCs 0.0 10e3/uL   [place] Draw     Status: None    Narrative    The following orders were created for panel order [place] Draw.  Procedure                               Abnormality         Status                     ---------                               -----------         ------                     Extra Blue Top Tube[[id number]]                              Final result               Extra Red Top Tube[[id number]]                               Final result                 Please view results for these tests on the individual orders.   Extra Blue Top Tube     Status: None   Result Value Ref Range    Hold Specimen JIC    Extra Red Top Tube     Status: None   Result Value Ref Range    Hold Specimen JIC    EKG 12 lead     Status: None   Result Value Ref Range    Systolic Blood Pressure  mmHg    Diastolic Blood Pressure  mmHg     Ventricular Rate 56 BPM    Atrial Rate 56 BPM    WA Interval 132 ms    QRS Duration 100 ms     ms    QTc 407 ms    P Axis 23 degrees    R AXIS 5 degrees    T Axis 5 degrees    Interpretation ECG       Sinus bradycardia  Otherwise normal ECG  Unconfirmed report - interpretation of this ECG is computer generated - see medical record for final interpretation  Confirmed by - EMERGENCY ROOM, PHYSICIAN ([id number]),  [place] ([id number]) on 1/29/2023 8:47:31 PM     CBC with platelets differential     Status: Abnormal    Narrative    The following orders were created for panel order CBC with platelets differential.  Procedure                               Abnormality         Status                     ---------                               -----------         ------                     CBC with platelets and d...[[id number]]  Abnormal            Final result                 Please view results for these tests on the individual orders.     Medications   gadobutrol (GADAVIST) injection 9.89 mL (9.89 mLs Intravenous Given 1/29/23 2142)     Labs Ordered and Resulted from Time of ED Arrival to Time of ED Departure   COMPREHENSIVE METABOLIC PANEL - Abnormal       Result Value    Sodium 138      Potassium 3.7      Chloride 107      Carbon Dioxide (CO2) 25      Anion Gap 6      Urea Nitrogen 17      Creatinine 1.01      Calcium 9.4      Glucose 94      Alkaline Phosphatase 37 (*)     AST 33      ALT 71 (*)     Protein Total 8.0      Albumin 4.3      Bilirubin Total 0.4      GFR Estimate >90     CBC WITH PLATELETS AND DIFFERENTIAL - Abnormal    WBC Count 9.3      RBC Count 3.71 (*)     Hemoglobin 12.7 (*)     Hematocrit 37.7 (*)      (*)     MCH 34.2 (*)     MCHC 33.7      RDW 11.9      Platelet Count 364      % Neutrophils 55      % Lymphocytes 29      % Monocytes 10      % Eosinophils 4      % Basophils 2      % Immature Granulocytes 0      NRBCs per 100 WBC 0      Absolute Neutrophils 5.2      Absolute  Lymphocytes 2.7      Absolute Monocytes 0.9      Absolute Eosinophils 0.4      Absolute Basophils 0.1      Absolute Immature Granulocytes 0.0      Absolute NRBCs 0.0     ROUTINE UA WITH MICROSCOPIC REFLEX TO CULTURE - Normal    Color Urine Straw      Appearance Urine Clear      Glucose Urine Negative      Bilirubin Urine Negative      Ketones Urine Negative      Specific Gravity Urine 1.009      Blood Urine Negative      pH Urine 5.0      Protein Albumin Urine Negative      Urobilinogen Urine Normal      Nitrite Urine Negative      Leukocyte Esterase Urine Negative      RBC Urine 1      WBC Urine 0     LIPASE - Normal    Lipase 272     TROPONIN I - Normal    Troponin I High Sensitivity 5       MR Brain w/o & w Contrast   Final Result   IMPRESSION:   HEAD MRI:    1.  Negative for acute intracranial abnormality.      HEAD MRA:    1.  Normal MRA [place].      NECK MRA:   1.  Normal neck MRA.      MRA Neck ([place]) wo & w Contrast   Final Result   IMPRESSION:   HEAD MRI:    1.  Negative for acute intracranial abnormality.      HEAD MRA:    1.  Normal MRA [place].      NECK MRA:   1.  Normal neck MRA.      MRA Brain ([place]) wo Contrast   Final Result   IMPRESSION:   HEAD MRI:    1.  Negative for acute intracranial abnormality.      HEAD MRA:    1.  Normal MRA [place].      NECK MRA:   1.  Normal neck MRA.             Medical Decision Making  The patient's presentation is strongly suggestive of an acute health issue posing potential threat to life or bodily function.    The patient's evaluation involved:  review of external note(s) from 1 sources (see separate area of note for details)  ordering and/or review of 3+ test(s) in this encounter (see separate area of note for details)  review of 3+ test result(s) ordered prior to this encounter (see separate area of note for details)  independent interpretation of testing performed by another health professional (see separate area of  note for details)    The patient's management involved a decision regarding hospitalization.      Assessment & Plan    Willis Mcnulty is a 42-year-old male with past medical history of coronary artery disease, anxiety and alcohol use presenting with acute confusion, speech change and right arm numbness.  Arrives with vital signs within normal limits.  Neurologic exam normal.  Also reporting abdominal discomfort over the past week, abdomen is benign.  Will evaluate for acute stroke or other intra cranial process with MRI stroke.  For his ongoing abdominal pain, will evaluate for hepatitis, cholelithiasis, pancreatitis and other intra-abdominal pathologies.    CBC with hemoglobin 12.7, similar to prior.  Mild MCV elevation.  CMP within normal limits.  Urinalysis was clear.  Lipase normal.  EKG was nonischemic and initial troponin negative.  He is PERC negative for PE.    MRI is negative for acute stroke or other intracranial process.  Advised close PCP follow-up and return to the emergency department with any new or worsening symptoms.    I have reviewed the nursing notes. I have reviewed the findings, diagnosis, plan and need for follow up with the patient.    Discharge Medication List as of 1/29/2023 10:14 PM          Final diagnoses:   Abdominal pain, generalized   Right arm numbness   Confusion       Lisbeth Obrien MD  [facility] EMERGENCY DEPARTMENT  1/29/2023     Lisbeth Obrien MD  01/29/23 [id number]

## 2023-01-30 NOTE — ED TRIAGE NOTES
Generalized abdominal pains x2 weeks. Was laid off of work the 13th, binging alcohol for 10 days. Tapered himself off, today is day 8 without drinking.     Came in tonight d/t confusion and R arm numbness lasted for about 20 minutes about 45 minutes ago.

## 2023-03-12 ENCOUNTER — HOSPITAL ENCOUNTER (EMERGENCY)
Facility: CLINIC | Age: 43
Discharge: HOME | End: 2023-03-12
Attending: EMERGENCY MEDICINE | Admitting: EMERGENCY MEDICINE
Payer: COMMERCIAL

## 2023-03-12 VITALS
HEART RATE: 100 BPM | BODY MASS INDEX: 27.48 KG/M2 | DIASTOLIC BLOOD PRESSURE: 107 MMHG | OXYGEN SATURATION: 96 % | SYSTOLIC BLOOD PRESSURE: 139 MMHG | TEMPERATURE: 98 F | HEIGHT: 75 IN | WEIGHT: 221 LBS | RESPIRATION RATE: 16 BRPM

## 2023-03-12 DIAGNOSIS — F10.10 ALCOHOL ABUSE: ICD-10-CM

## 2023-03-12 LAB
ALBUMIN SERPL BCG-MCNC: 4.6 G/DL (ref 3.5–5.2)
ALCOHOL BREATH TEST: 0.14 (ref 0–0.01)
ALP SERPL-CCNC: 41 U/L (ref 40–129)
ALT SERPL W P-5'-P-CCNC: 19 U/L (ref 10–50)
AMPHETAMINES UR QL SCN: ABNORMAL
ANION GAP SERPL CALCULATED.3IONS-SCNC: 15 MMOL/L (ref 7–15)
AST SERPL W P-5'-P-CCNC: 20 U/L (ref 10–50)
BARBITURATES UR QL SCN: ABNORMAL
BASOPHILS # BLD AUTO: 0.1 10E3/UL (ref 0–0.2)
BASOPHILS NFR BLD AUTO: 1 %
BENZODIAZ UR QL SCN: ABNORMAL
BILIRUB SERPL-MCNC: 0.5 MG/DL
BUN SERPL-MCNC: 7.5 MG/DL (ref 6–20)
BZE UR QL SCN: ABNORMAL
CALCIUM SERPL-MCNC: 9.4 MG/DL (ref 8.6–10)
CANNABINOIDS UR QL SCN: ABNORMAL
CHLORIDE SERPL-SCNC: 92 MMOL/L (ref 98–107)
CREAT SERPL-MCNC: 0.83 MG/DL (ref 0.67–1.17)
DEPRECATED HCO3 PLAS-SCNC: 25 MMOL/L (ref 22–29)
EOSINOPHIL # BLD AUTO: 0.2 10E3/UL (ref 0–0.7)
EOSINOPHIL NFR BLD AUTO: 4 %
ERYTHROCYTE [DISTWIDTH] IN BLOOD BY AUTOMATED COUNT: 11.5 % (ref 10–15)
ETHANOL SERPL-MCNC: 0.18 G/DL
GFR SERPL CREATININE-BSD FRML MDRD: >90 ML/MIN/1.73M2
GLUCOSE SERPL-MCNC: 123 MG/DL (ref 70–99)
HCT VFR BLD AUTO: 39.4 % (ref 40–53)
HGB BLD-MCNC: 13.9 G/DL (ref 13.3–17.7)
IMM GRANULOCYTES # BLD: 0 10E3/UL
IMM GRANULOCYTES NFR BLD: 0 %
LYMPHOCYTES # BLD AUTO: 2.5 10E3/UL (ref 0.8–5.3)
LYMPHOCYTES NFR BLD AUTO: 43 %
MAGNESIUM SERPL-MCNC: 1.9 MG/DL (ref 1.7–2.3)
MCH RBC QN AUTO: 32.6 PG (ref 26.5–33)
MCHC RBC AUTO-ENTMCNC: 35.3 G/DL (ref 31.5–36.5)
MCV RBC AUTO: 92 FL (ref 78–100)
MONOCYTES # BLD AUTO: 0.4 10E3/UL (ref 0–1.3)
MONOCYTES NFR BLD AUTO: 7 %
NEUTROPHILS # BLD AUTO: 2.5 10E3/UL (ref 1.6–8.3)
NEUTROPHILS NFR BLD AUTO: 45 %
NRBC # BLD AUTO: 0 10E3/UL
NRBC BLD AUTO-RTO: 0 /100
OPIATES UR QL SCN: ABNORMAL
PLATELET # BLD AUTO: 346 10E3/UL (ref 150–450)
POTASSIUM SERPL-SCNC: 4.1 MMOL/L (ref 3.4–5.3)
PROT SERPL-MCNC: 7.2 G/DL (ref 6.4–8.3)
RBC # BLD AUTO: 4.27 10E6/UL (ref 4.4–5.9)
SARS-COV-2 RNA RESP QL NAA+PROBE: NEGATIVE
SODIUM SERPL-SCNC: 132 MMOL/L (ref 136–145)
TROPONIN T SERPL HS-MCNC: <6 NG/L
WBC # BLD AUTO: 5.7 10E3/UL (ref 4–11)

## 2023-03-12 PROCEDURE — 80053 COMPREHEN METABOLIC PANEL: CPT | Performed by: EMERGENCY MEDICINE

## 2023-03-12 PROCEDURE — 36415 COLL VENOUS BLD VENIPUNCTURE: CPT | Performed by: EMERGENCY MEDICINE

## 2023-03-12 PROCEDURE — 93010 ELECTROCARDIOGRAM REPORT: CPT | Performed by: EMERGENCY MEDICINE

## 2023-03-12 PROCEDURE — U0003 INFECTIOUS AGENT DETECTION BY NUCLEIC ACID (DNA OR RNA); SEVERE ACUTE RESPIRATORY SYNDROME CORONAVIRUS 2 (SARS-COV-2) (CORONAVIRUS DISEASE [COVID-19]), AMPLIFIED PROBE TECHNIQUE, MAKING USE OF HIGH THROUGHPUT TECHNOLOGIES AS DESCRIBED BY CMS-2020-01-R: HCPCS | Performed by: EMERGENCY MEDICINE

## 2023-03-12 PROCEDURE — C9803 HOPD COVID-19 SPEC COLLECT: HCPCS | Performed by: EMERGENCY MEDICINE

## 2023-03-12 PROCEDURE — 85025 COMPLETE CBC W/AUTO DIFF WBC: CPT | Performed by: EMERGENCY MEDICINE

## 2023-03-12 PROCEDURE — 250N000011 HC RX IP 250 OP 636: Performed by: EMERGENCY MEDICINE

## 2023-03-12 PROCEDURE — 96365 THER/PROPH/DIAG IV INF INIT: CPT | Performed by: EMERGENCY MEDICINE

## 2023-03-12 PROCEDURE — 99284 EMERGENCY DEPT VISIT MOD MDM: CPT | Mod: 25 | Performed by: EMERGENCY MEDICINE

## 2023-03-12 PROCEDURE — 84484 ASSAY OF TROPONIN QUANT: CPT | Performed by: EMERGENCY MEDICINE

## 2023-03-12 PROCEDURE — 83735 ASSAY OF MAGNESIUM: CPT | Performed by: EMERGENCY MEDICINE

## 2023-03-12 PROCEDURE — 80307 DRUG TEST PRSMV CHEM ANLYZR: CPT | Performed by: EMERGENCY MEDICINE

## 2023-03-12 PROCEDURE — 93005 ELECTROCARDIOGRAM TRACING: CPT | Performed by: EMERGENCY MEDICINE

## 2023-03-12 PROCEDURE — 82077 ASSAY SPEC XCP UR&BREATH IA: CPT | Performed by: EMERGENCY MEDICINE

## 2023-03-12 PROCEDURE — 258N000001 HC RX 258: Performed by: EMERGENCY MEDICINE

## 2023-03-12 PROCEDURE — 250N000013 HC RX MED GY IP 250 OP 250 PS 637: Performed by: EMERGENCY MEDICINE

## 2023-03-12 PROCEDURE — 96375 TX/PRO/DX INJ NEW DRUG ADDON: CPT | Performed by: EMERGENCY MEDICINE

## 2023-03-12 PROCEDURE — 82075 ASSAY OF BREATH ETHANOL: CPT | Performed by: EMERGENCY MEDICINE

## 2023-03-12 RX ORDER — DIAZEPAM 10 MG
10 TABLET ORAL ONCE
Status: COMPLETED | OUTPATIENT
Start: 2023-03-12 | End: 2023-03-12

## 2023-03-12 RX ORDER — ONDANSETRON 2 MG/ML
4 INJECTION INTRAMUSCULAR; INTRAVENOUS ONCE
Status: COMPLETED | OUTPATIENT
Start: 2023-03-12 | End: 2023-03-12

## 2023-03-12 RX ORDER — MULTIPLE VITAMINS W/ MINERALS TAB 9MG-400MCG
1 TAB ORAL ONCE
Status: COMPLETED | OUTPATIENT
Start: 2023-03-12 | End: 2023-03-12

## 2023-03-12 RX ADMIN — MULTIPLE VITAMINS W/ MINERALS TAB 1 TABLET: TAB at 05:21

## 2023-03-12 RX ADMIN — DIAZEPAM 10 MG: 10 TABLET ORAL at 05:21

## 2023-03-12 RX ADMIN — ONDANSETRON 4 MG: 2 INJECTION INTRAMUSCULAR; INTRAVENOUS at 05:21

## 2023-03-12 RX ADMIN — FOLIC ACID: 5 INJECTION, SOLUTION INTRAMUSCULAR; INTRAVENOUS; SUBCUTANEOUS at 05:31

## 2023-03-12 ASSESSMENT — ACTIVITIES OF DAILY LIVING (ADL)
ADLS_ACUITY_SCORE: 35
ADLS_ACUITY_SCORE: 35

## 2023-03-12 NOTE — ED PROVIDER NOTES
"ED Provider Note  [facility]      History     Chief Complaint   Patient presents with     Withdrawal     Sober for 60 days. Started drinking last Saturday and stopped last night. Having withdrawals. Denies hx of seizures. Last drink yesterday. Drinking beer. \" I had a few.\"     HPI  Willis Mcnulty is a 42 year old male who presents to the ED with concern for tremors.  He states that he had been a daily alcohol user for the past 20 years until he quit 60 days ago.  He was able to wean off on his own.  He was sober for 60 days.  Relapsed last Saturday and has been drinking \"a few beers and a few shots\" daily since that time.  His last drink was 2 to 3 hours prior to arrival.  Throughout the day yesterday and today he had significant tremors.  He feels like he is going through withdrawals.  He has never had these symptoms before.  He feels much worse than when he weaned himself off 2 months ago.  Denies any overt pain.  Has a prescription for Ativan which she tried at home which provided minimal relief.  Endorses intermittent nausea without vomiting.  No fevers or chills.  No other substances.    Past Medical History  History reviewed. No pertinent past medical history.  Past Surgical History:   Procedure Laterality Date     ORTHOPEDIC SURGERY       carvedilol (COREG) 6.25 MG tablet  clonazePAM (KLONOPIN) 0.5 MG tablet  losartan (COZAAR) 100 MG tablet  IBUPROFEN PO  oxyCODONE-acetaminophen (PERCOCET) 5-325 MG per tablet      No Known Allergies  Family History  History reviewed. No pertinent family history.  Social History   Social History     Tobacco Use     Smoking status: Former   Substance Use Topics     Alcohol use: Yes     Comment: 4-7 drinks per day (2022)     Drug use: Yes     Types: Marijuana     Comment: occasional         A medically appropriate review of systems was performed with pertinent positives and negatives noted in the HPI, and all other systems negative.    Physical Exam " "  BP: (!) 154/102  Pulse: 96  Temp: 98  F (36.7  C)  Resp: 16  Height: 190.5 cm (6' 3\")  Weight: 100.2 kg (221 lb)  SpO2: 100 %  Physical Exam  Vitals and nursing note reviewed.   Constitutional:       General: He is not in acute distress.     Appearance: Normal appearance.      Comments: tremulous   HENT:      Head: Normocephalic.      Nose: Nose normal.   Eyes:      Pupils: Pupils are equal, round, and reactive to light.   Cardiovascular:      Rate and Rhythm: Normal rate and regular rhythm.   Pulmonary:      Effort: Pulmonary effort is normal.   Abdominal:      General: There is no distension.   Musculoskeletal:         General: No deformity. Normal range of motion.      Cervical back: Normal range of motion.   Skin:     General: Skin is warm.   Neurological:      Mental Status: He is alert and oriented to person, place, and time.   Psychiatric:         Attention and Perception: Attention normal.         Mood and Affect: Mood normal.         Speech: Speech normal.         Behavior: Behavior is cooperative.         Thought Content: Thought content is not paranoid or delusional. Thought content does not include homicidal ideation. Thought content does not include homicidal plan.         Cognition and Memory: Cognition normal.         Judgment: Judgment normal.           ED Course, Procedures, & Data     ED Course as of 03/13/23 0015   Sun Mar 12, 2023   0509      EKG Interpretation:     Interpreted by Zen Ramsey DO  Time reviewed:0510   Symptoms at time of EKG: chest pain   Rhythm: normal sinus   Rate: normal  Axis: NORMAL  Ectopy: none  Conduction: normal  ST Segments/ T Waves: No ST-T wave changes  Q Waves: none  Comparison to prior: No old EKG available    Clinical Impression: normal EKG          Results for orders placed or performed during the hospital encounter of 03/12/23   Magnesium     Status: Normal   Result Value Ref Range    Magnesium 1.9 1.7 - 2.3 mg/dL   Ethyl Alcohol Level     Status: Abnormal "   Result Value Ref Range    Alcohol ethyl 0.18 (H) <=0.01 g/dL   Comprehensive metabolic panel     Status: Abnormal   Result Value Ref Range    Sodium 132 (L) 136 - 145 mmol/L    Potassium 4.1 3.4 - 5.3 mmol/L    Chloride 92 (L) 98 - 107 mmol/L    Carbon Dioxide (CO2) 25 22 - 29 mmol/L    Anion Gap 15 7 - 15 mmol/L    Urea Nitrogen 7.5 6.0 - 20.0 mg/dL    Creatinine 0.83 0.67 - 1.17 mg/dL    Calcium 9.4 8.6 - 10.0 mg/dL    Glucose 123 (H) 70 - 99 mg/dL    Alkaline Phosphatase 41 40 - 129 U/L    AST 20 10 - 50 U/L    ALT 19 10 - 50 U/L    Protein Total 7.2 6.4 - 8.3 g/dL    Albumin 4.6 3.5 - 5.2 g/dL    Bilirubin Total 0.5 <=1.2 mg/dL    GFR Estimate >90 >60 mL/min/1.73m2   Troponin T, High Sensitivity     Status: Normal   Result Value Ref Range    Troponin T, High Sensitivity <6 <=22 ng/L   CBC with platelets and differential     Status: Abnormal   Result Value Ref Range    WBC Count 5.7 4.0 - 11.0 10e3/uL    RBC Count 4.27 (L) 4.40 - 5.90 10e6/uL    Hemoglobin 13.9 13.3 - 17.7 g/dL    Hematocrit 39.4 (L) 40.0 - 53.0 %    MCV 92 78 - 100 fL    MCH 32.6 26.5 - 33.0 pg    MCHC 35.3 31.5 - 36.5 g/dL    RDW 11.5 10.0 - 15.0 %    Platelet Count 346 150 - 450 10e3/uL    % Neutrophils 45 %    % Lymphocytes 43 %    % Monocytes 7 %    % Eosinophils 4 %    % Basophils 1 %    % Immature Granulocytes 0 %    NRBCs per 100 WBC 0 <1 /100    Absolute Neutrophils 2.5 1.6 - 8.3 10e3/uL    Absolute Lymphocytes 2.5 0.8 - 5.3 10e3/uL    Absolute Monocytes 0.4 0.0 - 1.3 10e3/uL    Absolute Eosinophils 0.2 0.0 - 0.7 10e3/uL    Absolute Basophils 0.1 0.0 - 0.2 10e3/uL    Absolute Immature Granulocytes 0.0 <=0.4 10e3/uL    Absolute NRBCs 0.0 10e3/uL   Asymptomatic COVID-19 Virus (Coronavirus) by PCR Nasopharyngeal     Status: Normal    Specimen: Nasopharyngeal; Swab   Result Value Ref Range    SARS CoV2 PCR Negative Negative    Narrative    Testing was performed using the [employer] Xpress SARS-CoV-2 Assay on the Cepheid Gene-Xpert Instrument  Systems. Additional information about this Emergency Use Authorization (EUA) assay can be found via the Lab Guide. This test should be ordered for the detection of SARS-CoV-2 in individuals who meet SARS-CoV-2 clinical and/or epidemiological criteria as well as from individuals without symptoms or other reasons to suspect COVID-19. Test performance for asymptomatic patients has only been established in anterior nasal swab specimens. This test is for in vitro diagnostic use under the FDA EUA for laboratories certified under CLIA to perform high complexity testing. This test has not been FDA cleared or approved. A negative result does not rule out the presence of PCR inhibitors in the specimen or target RNA concentration below the limit of detection for the assay. The possibility of a false negative should be considered if the patient's recent exposure or clinical presentation suggests COVID-19. This test was validated by the [facility]. This laboratory is certified under the Clinical Laboratory Improvement Amendments (CLIA) as qualified to perform high complexity laboratory testing.     Drug abuse screen 1 urine (ED)     Status: Abnormal   Result Value Ref Range    Amphetamines Urine Screen Negative Screen Negative    Barbituates Urine Screen Negative Screen Negative    Benzodiazepine Urine Screen Negative Screen Negative    Cannabinoids Urine Screen Positive (A) Screen Negative    Cocaine Urine Screen Negative Screen Negative    Opiates Urine Screen Negative Screen Negative   EKG 12-lead, tracing only     Status: None (Preliminary result)   Result Value Ref Range    Systolic Blood Pressure  mmHg    Diastolic Blood Pressure  mmHg    Ventricular Rate 84 BPM    Atrial Rate 84 BPM    NV Interval 140 ms    QRS Duration 98 ms     ms    QTc 470 ms    P Axis 45 degrees    R AXIS 34 degrees    T Axis 50 degrees    Interpretation ECG Sinus rhythm  Normal ECG      Alcohol breath test  POCT     Status: Abnormal   Result Value Ref Range    Alcohol Breath Test 0.139 (A) 0.00 - 0.01   CBC with platelets differential     Status: Abnormal    Narrative    The following orders were created for panel order CBC with platelets differential.  Procedure                               Abnormality         Status                     ---------                               -----------         ------                     CBC with platelets and d...[[id number]]  Abnormal            Final result                 Please view results for these tests on the individual orders.   Urine Drugs of Abuse Screen     Status: Abnormal    Narrative    The following orders were created for panel order Urine Drugs of Abuse Screen.  Procedure                               Abnormality         Status                     ---------                               -----------         ------                     Drug abuse screen 1 urin...[[id number]]  Abnormal            Final result                 Please view results for these tests on the individual orders.     Medications   dextrose 5% and 0.45% NaCl 1,000 mL with thiamine 100 mg, folic acid 1 mg infusion ( Intravenous Stopped 3/12/23 0617)   ondansetron (ZOFRAN) injection 4 mg (4 mg Intravenous $Given 3/12/23 0521)   diazepam (VALIUM) tablet 10 mg (10 mg Oral $Given 3/12/23 0521)   multivitamin w/minerals (THERA-VIT-M) tablet 1 tablet (1 tablet Oral $Given 3/12/23 0521)     Labs Ordered and Resulted from Time of ED Arrival to Time of ED Departure   ETHYL ALCOHOL LEVEL - Abnormal       Result Value    Alcohol ethyl 0.18 (*)    COMPREHENSIVE METABOLIC PANEL - Abnormal    Sodium 132 (*)     Potassium 4.1      Chloride 92 (*)     Carbon Dioxide (CO2) 25      Anion Gap 15      Urea Nitrogen 7.5      Creatinine 0.83      Calcium 9.4      Glucose 123 (*)     Alkaline Phosphatase 41      AST 20      ALT 19      Protein Total 7.2      Albumin 4.6      Bilirubin Total 0.5      GFR Estimate >90      CBC WITH PLATELETS AND DIFFERENTIAL - Abnormal    WBC Count 5.7      RBC Count 4.27 (*)     Hemoglobin 13.9      Hematocrit 39.4 (*)     MCV 92      MCH 32.6      MCHC 35.3      RDW 11.5      Platelet Count 346      % Neutrophils 45      % Lymphocytes 43      % Monocytes 7      % Eosinophils 4      % Basophils 1      % Immature Granulocytes 0      NRBCs per 100 WBC 0      Absolute Neutrophils 2.5      Absolute Lymphocytes 2.5      Absolute Monocytes 0.4      Absolute Eosinophils 0.2      Absolute Basophils 0.1      Absolute Immature Granulocytes 0.0      Absolute NRBCs 0.0     DRUG ABUSE SCREEN 1 URINE (ED) - Abnormal    Amphetamines Urine Screen Negative      Barbituates Urine Screen Negative      Benzodiazepine Urine Screen Negative      Cannabinoids Urine Screen Positive (*)     Cocaine Urine Screen Negative      Opiates Urine Screen Negative     ALCOHOL BREATH TEST POCT - Abnormal    Alcohol Breath Test 0.139 (*)    MAGNESIUM - Normal    Magnesium 1.9     TROPONIN T, HIGH SENSITIVITY - Normal    Troponin T, High Sensitivity <6     COVID-19 VIRUS (CORONAVIRUS) BY PCR - Normal    SARS CoV2 PCR Negative       No orders to display          Critical care was not performed.     Medical Decision Making  The patient's presentation was of moderate complexity (a chronic illness mild to moderate exacerbation, progression, or side effect of treatment).    The patient's evaluation involved:  review of 3+ test result(s) ordered prior to this encounter (see separate area of note for details)    The patient's management necessitated moderate risk (prescription drug management including medications given in the ED).      Assessment & Plan    Patient presents to the ED with concern for alcohol withdrawal symptoms.  He primarily complains of generalized body shaking.  On arrival he is mildly tremulous.  He is slightly hypertensive.  Has an anxious affect.  On arrival to his ED room he said he also had some left-sided sharp chest  pain which he did not have prior to coming to the ED.    Laboratory work-up is largely reassuring.  Normal electrolytes.  Normal magnesium.  No leukocytosis.  No anemia.  High-sensitivity troponin is negative x1.  EKG shows normal sinus rhythm without signs of acute ischemia.  No suspicion for ACS.  His chest pain has resolved on reassessment.  No dyspnea or hypoxia.  Suspect likely related to alcohol withdrawal versus anxiety.  Again, this was not what brought him to the ED today.  Exam/history not consistent with PE, aortic dissection, pericardial effusion, or other more sinister chest pathology.    Patient was given a banana bag, IV Zofran, and 1 dose of Valium.  His breathalyzer is 0.13.  Given his longstanding history, is likely experiencing early withdrawal symptoms.  No evidence of seizures , DTs, or Warnicke Korsakoff.    I recommended inpatient detox.  Patient would rather go home.  He was observed in the ED for 4 hours.  He is clinically sober.  His tremulous symptoms have persisted but he would rather wean off alcohol at home.  He was discharged in good condition with outpatient resources.    I have reviewed the nursing notes. I have reviewed the findings, diagnosis, plan and need for follow up with the patient.    Discharge Medication List as of 3/12/2023  7:57 AM          Final diagnoses:   Alcohol abuse       Zen Ramsey DO  [facility] EMERGENCY DEPARTMENT  3/12/2023     Zen Ramsey DO  03/13/23 0017

## 2023-03-12 NOTE — DISCHARGE INSTRUCTIONS
Please use the below resources and your primary care physician to safely cease alcohol and/or substance use.     Return to the ED if you are having any urgent/life-threatening concerns.     DISCHARGE RESOURCES:  -[facility]: [phone] (detox), [phone] (outpatient & [facility])  -[facility] ([address]): [phone]  -[facility]: [phone]  -[facility]: [phone]  -[facility] - self management for addiction recovery:  www.smartrecovery.org    -[facility]: [phone].  -[facility] [phone]   -Substance Abuse and Mental Health Services (www.samhsa.gov)  -Harm Reduction Coalition (www. Harmreduction.org)  -[place] Opioid Prevention Coalition: www.opioidcoalition.org  -Poison control 1-768.677.9767       Sober Support Group Information:  AA/NA & Sponsor/Support  -Alcoholics Anonymous (www.alcoholics-anonymous.org): for local information 24 hours/day  -AA Intergroup service office in [place] (http://www.aastpaul.org/) [phone]  -AA Intergroup service office in [facility]: [phone]. (http://www.a[place].org/)  -Narcotics Anonymous (www.nam[place].org) (106) 324-6423   -Sober Fun Activities: www.sober-activities.[employer].Sports.ws/[facility]//[facility] ([facility])  [facility] connects people seeking recovery to resources that help foster and sustain long-term recovery.  Whether you are seeking resources for treatment, transportation, housing, job training, education, health care or other pathways to recovery, [facility] is a great place to start.   Phone: [phone]. www.[employer].EventWith (Great listing of all types of recovery and non-recovery related resources)

## 2023-03-12 NOTE — ED NOTES
Pt evaluated by MD and determined appropriate for discharge to self. Discharge instructions were explained to him. He verbalized understanding and agreed to adhere. Pt was calm, in behavioral control, and appears in no sign of distress. Pt states he plans to take HTN medication at home. Danger s/s of etoh w/d discussed w him, he verbalized understanding.

## 2023-03-13 LAB
ATRIAL RATE - MUSE: 84 BPM
DIASTOLIC BLOOD PRESSURE - MUSE: NORMAL MMHG
INTERPRETATION ECG - MUSE: NORMAL
P AXIS - MUSE: 45 DEGREES
PR INTERVAL - MUSE: 140 MS
QRS DURATION - MUSE: 98 MS
QT - MUSE: 398 MS
QTC - MUSE: 470 MS
R AXIS - MUSE: 34 DEGREES
SYSTOLIC BLOOD PRESSURE - MUSE: NORMAL MMHG
T AXIS - MUSE: 50 DEGREES
VENTRICULAR RATE- MUSE: 84 BPM